# Patient Record
Sex: MALE | Race: WHITE | NOT HISPANIC OR LATINO | Employment: OTHER | URBAN - METROPOLITAN AREA
[De-identification: names, ages, dates, MRNs, and addresses within clinical notes are randomized per-mention and may not be internally consistent; named-entity substitution may affect disease eponyms.]

---

## 2021-09-18 ENCOUNTER — HOSPITAL ENCOUNTER (EMERGENCY)
Facility: HOSPITAL | Age: 71
Discharge: HOME | End: 2021-09-18
Attending: EMERGENCY MEDICINE
Payer: MEDICARE

## 2021-09-18 VITALS
SYSTOLIC BLOOD PRESSURE: 149 MMHG | BODY MASS INDEX: 26.68 KG/M2 | OXYGEN SATURATION: 99 % | DIASTOLIC BLOOD PRESSURE: 68 MMHG | RESPIRATION RATE: 13 BRPM | WEIGHT: 170 LBS | TEMPERATURE: 96 F | HEART RATE: 71 BPM | HEIGHT: 67 IN

## 2021-09-18 DIAGNOSIS — S09.90XA CLOSED HEAD INJURY, INITIAL ENCOUNTER: ICD-10-CM

## 2021-09-18 DIAGNOSIS — S01.512A LACERATION OF ORAL CAVITY, INITIAL ENCOUNTER: Primary | ICD-10-CM

## 2021-09-18 PROCEDURE — 99284 EMERGENCY DEPT VISIT MOD MDM: CPT | Mod: 25

## 2021-09-18 PROCEDURE — 90715 TDAP VACCINE 7 YRS/> IM: CPT | Performed by: EMERGENCY MEDICINE

## 2021-09-18 PROCEDURE — 90471 IMMUNIZATION ADMIN: CPT | Performed by: EMERGENCY MEDICINE

## 2021-09-18 PROCEDURE — 25000003 PHARM REV CODE 250: Performed by: EMERGENCY MEDICINE

## 2021-09-18 PROCEDURE — 12052 INTMD RPR FACE/MM 2.6-5.0 CM: CPT

## 2021-09-18 PROCEDURE — 12013 RPR F/E/E/N/L/M 2.6-5.0 CM: CPT

## 2021-09-18 PROCEDURE — 63600175 PHARM REV CODE 636 W HCPCS: Performed by: EMERGENCY MEDICINE

## 2021-09-18 RX ORDER — GLYBURIDE 2.5 MG/1
2.5 TABLET ORAL 2 TIMES DAILY
COMMUNITY
Start: 2021-06-19 | End: 2024-02-29 | Stop reason: DRUGHIGH

## 2021-09-18 RX ORDER — CLOPIDOGREL BISULFATE 75 MG/1
75 TABLET ORAL DAILY
COMMUNITY
Start: 2021-06-19

## 2021-09-18 RX ORDER — LIDOCAINE HYDROCHLORIDE AND EPINEPHRINE 5; 5 MG/ML; UG/ML
1 INJECTION, SOLUTION INFILTRATION; PERINEURAL ONCE
Status: DISCONTINUED | OUTPATIENT
Start: 2021-09-18 | End: 2021-09-18

## 2021-09-18 RX ORDER — METOPROLOL TARTRATE 100 MG/1
50 TABLET ORAL 2 TIMES DAILY
COMMUNITY
Start: 2021-06-19

## 2021-09-18 RX ORDER — LIDOCAINE HYDROCHLORIDE AND EPINEPHRINE 10; 10 MG/ML; UG/ML
1 INJECTION, SOLUTION INFILTRATION; PERINEURAL ONCE
Status: COMPLETED | OUTPATIENT
Start: 2021-09-18 | End: 2021-09-18

## 2021-09-18 RX ORDER — METFORMIN HYDROCHLORIDE 1000 MG/1
1000 TABLET ORAL EVERY MORNING
COMMUNITY
Start: 2021-06-19

## 2021-09-18 RX ORDER — ATORVASTATIN CALCIUM 40 MG/1
40 TABLET, FILM COATED ORAL DAILY
COMMUNITY
Start: 2021-06-19

## 2021-09-18 RX ORDER — RAMIPRIL 2.5 MG/1
2.5 CAPSULE ORAL NIGHTLY
COMMUNITY
Start: 2021-06-19

## 2021-09-18 RX ADMIN — CLOSTRIDIUM TETANI TOXOID ANTIGEN (FORMALDEHYDE INACTIVATED), CORYNEBACTERIUM DIPHTHERIAE TOXOID ANTIGEN (FORMALDEHYDE INACTIVATED), BORDETELLA PERTUSSIS TOXOID ANTIGEN (GLUTARALDEHYDE INACTIVATED), BORDETELLA PERTUSSIS FILAMENTOUS HEMAGGLUTININ ANTIGEN (FORMALDEHYDE INACTIVATED), BORDETELLA PERTUSSIS PERTACTIN ANTIGEN, AND BORDETELLA PERTUSSIS FIMBRIAE 2/3 ANTIGEN 0.5 ML: 5; 2; 2.5; 5; 3; 5 INJECTION, SUSPENSION INTRAMUSCULAR at 02:09

## 2021-09-18 RX ADMIN — LIDOCAINE HYDROCHLORIDE,EPINEPHRINE BITARTRATE 1 ML: 10; .01 INJECTION, SOLUTION INFILTRATION; PERINEURAL at 03:09

## 2024-02-29 ENCOUNTER — HOSPITAL ENCOUNTER (INPATIENT)
Facility: HOSPITAL | Age: 74
LOS: 1 days | Discharge: HOME | DRG: 446 | End: 2024-03-01
Attending: EMERGENCY MEDICINE | Admitting: INTERNAL MEDICINE
Payer: MEDICARE

## 2024-02-29 DIAGNOSIS — K81.9 CHOLECYSTITIS: Primary | ICD-10-CM

## 2024-02-29 DIAGNOSIS — E11.9 TYPE 2 DIABETES MELLITUS WITHOUT COMPLICATION, WITHOUT LONG-TERM CURRENT USE OF INSULIN: ICD-10-CM

## 2024-02-29 DIAGNOSIS — I10 PRIMARY HYPERTENSION: ICD-10-CM

## 2024-02-29 DIAGNOSIS — K80.00 ACUTE CHOLECYSTITIS DUE TO BILIARY CALCULUS: ICD-10-CM

## 2024-02-29 DIAGNOSIS — R10.13 EPIGASTRIC PAIN: ICD-10-CM

## 2024-02-29 DIAGNOSIS — R10.10 UPPER ABDOMINAL PAIN: ICD-10-CM

## 2024-02-29 PROBLEM — I25.10 CORONARY ARTERY DISEASE WITHOUT ANGINA PECTORIS: Status: ACTIVE | Noted: 2024-02-29

## 2024-02-29 LAB
ALBUMIN SERPL BCP-MCNC: 3.6 G/DL (ref 3.5–5.2)
ALP SERPL-CCNC: 84 U/L (ref 55–135)
ALT SERPL W/O P-5'-P-CCNC: 13 U/L (ref 10–44)
ANION GAP SERPL CALC-SCNC: 15 MMOL/L (ref 8–16)
AST SERPL-CCNC: 20 U/L (ref 10–40)
BACTERIA #/AREA URNS HPF: ABNORMAL /HPF
BASOPHILS # BLD AUTO: 0.1 K/UL (ref 0–0.2)
BASOPHILS NFR BLD: 0.5 % (ref 0–1.9)
BILIRUB SERPL-MCNC: 0.8 MG/DL (ref 0.1–1)
BILIRUB UR QL STRIP: NEGATIVE
BUN SERPL-MCNC: 14 MG/DL (ref 8–23)
CALCIUM SERPL-MCNC: 10 MG/DL (ref 8.7–10.5)
CHLORIDE SERPL-SCNC: 105 MMOL/L (ref 95–110)
CLARITY UR: CLEAR
CO2 SERPL-SCNC: 19 MMOL/L (ref 23–29)
COLOR UR: YELLOW
CREAT SERPL-MCNC: 1.2 MG/DL (ref 0.5–1.4)
DIFFERENTIAL METHOD BLD: ABNORMAL
EOSINOPHIL # BLD AUTO: 0.4 K/UL (ref 0–0.5)
EOSINOPHIL NFR BLD: 1.8 % (ref 0–8)
ERYTHROCYTE [DISTWIDTH] IN BLOOD BY AUTOMATED COUNT: 12.3 % (ref 11.5–14.5)
EST. GFR  (NO RACE VARIABLE): >60 ML/MIN/1.73 M^2
ESTIMATED AVG GLUCOSE: 160 MG/DL (ref 68–131)
GLUCOSE SERPL-MCNC: 230 MG/DL (ref 70–110)
GLUCOSE UR QL STRIP: ABNORMAL
HBA1C MFR BLD: 7.2 % (ref 4–5.6)
HCT VFR BLD AUTO: 42.4 % (ref 40–54)
HGB BLD-MCNC: 14.8 G/DL (ref 14–18)
HGB UR QL STRIP: NEGATIVE
HYALINE CASTS #/AREA URNS LPF: 12 /LPF
IMM GRANULOCYTES # BLD AUTO: 0.12 K/UL (ref 0–0.04)
IMM GRANULOCYTES NFR BLD AUTO: 0.6 % (ref 0–0.5)
INR PPP: 1.1 (ref 0.8–1.2)
KETONES UR QL STRIP: ABNORMAL
LEUKOCYTE ESTERASE UR QL STRIP: NEGATIVE
LIPASE SERPL-CCNC: 38 U/L (ref 4–60)
LYMPHOCYTES # BLD AUTO: 3.2 K/UL (ref 1–4.8)
LYMPHOCYTES NFR BLD: 16.5 % (ref 18–48)
MCH RBC QN AUTO: 30.5 PG (ref 27–31)
MCHC RBC AUTO-ENTMCNC: 34.9 G/DL (ref 32–36)
MCV RBC AUTO: 87 FL (ref 82–98)
MICROSCOPIC COMMENT: ABNORMAL
MONOCYTES # BLD AUTO: 1.4 K/UL (ref 0.3–1)
MONOCYTES NFR BLD: 7.1 % (ref 4–15)
NEUTROPHILS # BLD AUTO: 14.3 K/UL (ref 1.8–7.7)
NEUTROPHILS NFR BLD: 73.5 % (ref 38–73)
NITRITE UR QL STRIP: NEGATIVE
NRBC BLD-RTO: 0 /100 WBC
OHS QRS DURATION: 82 MS
OHS QTC CALCULATION: 410 MS
PH UR STRIP: 6 [PH] (ref 5–8)
PLATELET # BLD AUTO: 425 K/UL (ref 150–450)
PMV BLD AUTO: 8.8 FL (ref 9.2–12.9)
POCT GLUCOSE: 167 MG/DL (ref 70–110)
POCT GLUCOSE: 173 MG/DL (ref 70–110)
POTASSIUM SERPL-SCNC: 4.1 MMOL/L (ref 3.5–5.1)
PROT SERPL-MCNC: 7.3 G/DL (ref 6–8.4)
PROT UR QL STRIP: ABNORMAL
PROTHROMBIN TIME: 11.6 SEC (ref 9–12.5)
RBC # BLD AUTO: 4.86 M/UL (ref 4.6–6.2)
RBC #/AREA URNS HPF: 0 /HPF (ref 0–4)
SODIUM SERPL-SCNC: 139 MMOL/L (ref 136–145)
SP GR UR STRIP: 1.02 (ref 1–1.03)
SQUAMOUS #/AREA URNS HPF: 0 /HPF
TROPONIN I SERPL DL<=0.01 NG/ML-MCNC: <0.006 NG/ML (ref 0–0.03)
URN SPEC COLLECT METH UR: ABNORMAL
UROBILINOGEN UR STRIP-ACNC: NEGATIVE EU/DL
WBC # BLD AUTO: 19.5 K/UL (ref 3.9–12.7)
WBC #/AREA URNS HPF: 0 /HPF (ref 0–5)
YEAST URNS QL MICRO: ABNORMAL

## 2024-02-29 PROCEDURE — 63600175 PHARM REV CODE 636 W HCPCS: Performed by: NURSE PRACTITIONER

## 2024-02-29 PROCEDURE — 25000003 PHARM REV CODE 250: Performed by: EMERGENCY MEDICINE

## 2024-02-29 PROCEDURE — 96376 TX/PRO/DX INJ SAME DRUG ADON: CPT

## 2024-02-29 PROCEDURE — 99285 EMERGENCY DEPT VISIT HI MDM: CPT | Mod: 25

## 2024-02-29 PROCEDURE — G0378 HOSPITAL OBSERVATION PER HR: HCPCS

## 2024-02-29 PROCEDURE — 81000 URINALYSIS NONAUTO W/SCOPE: CPT | Performed by: NURSE PRACTITIONER

## 2024-02-29 PROCEDURE — 83036 HEMOGLOBIN GLYCOSYLATED A1C: CPT | Performed by: INTERNAL MEDICINE

## 2024-02-29 PROCEDURE — 99222 1ST HOSP IP/OBS MODERATE 55: CPT | Mod: ,,, | Performed by: STUDENT IN AN ORGANIZED HEALTH CARE EDUCATION/TRAINING PROGRAM

## 2024-02-29 PROCEDURE — 25500020 PHARM REV CODE 255: Performed by: EMERGENCY MEDICINE

## 2024-02-29 PROCEDURE — 25000003 PHARM REV CODE 250

## 2024-02-29 PROCEDURE — 63600175 PHARM REV CODE 636 W HCPCS: Performed by: INTERNAL MEDICINE

## 2024-02-29 PROCEDURE — 87040 BLOOD CULTURE FOR BACTERIA: CPT | Performed by: INTERNAL MEDICINE

## 2024-02-29 PROCEDURE — 96375 TX/PRO/DX INJ NEW DRUG ADDON: CPT

## 2024-02-29 PROCEDURE — 80053 COMPREHEN METABOLIC PANEL: CPT | Performed by: NURSE PRACTITIONER

## 2024-02-29 PROCEDURE — 25000003 PHARM REV CODE 250: Performed by: INTERNAL MEDICINE

## 2024-02-29 PROCEDURE — 83690 ASSAY OF LIPASE: CPT | Performed by: NURSE PRACTITIONER

## 2024-02-29 PROCEDURE — 93005 ELECTROCARDIOGRAM TRACING: CPT

## 2024-02-29 PROCEDURE — 25000003 PHARM REV CODE 250: Performed by: STUDENT IN AN ORGANIZED HEALTH CARE EDUCATION/TRAINING PROGRAM

## 2024-02-29 PROCEDURE — 63600175 PHARM REV CODE 636 W HCPCS: Performed by: STUDENT IN AN ORGANIZED HEALTH CARE EDUCATION/TRAINING PROGRAM

## 2024-02-29 PROCEDURE — 0F9430Z DRAINAGE OF GALLBLADDER WITH DRAINAGE DEVICE, PERCUTANEOUS APPROACH: ICD-10-PCS | Performed by: STUDENT IN AN ORGANIZED HEALTH CARE EDUCATION/TRAINING PROGRAM

## 2024-02-29 PROCEDURE — 93010 ELECTROCARDIOGRAM REPORT: CPT | Mod: ,,, | Performed by: INTERNAL MEDICINE

## 2024-02-29 PROCEDURE — 96365 THER/PROPH/DIAG IV INF INIT: CPT

## 2024-02-29 PROCEDURE — 85610 PROTHROMBIN TIME: CPT | Performed by: EMERGENCY MEDICINE

## 2024-02-29 PROCEDURE — 85025 COMPLETE CBC W/AUTO DIFF WBC: CPT | Performed by: NURSE PRACTITIONER

## 2024-02-29 PROCEDURE — 96361 HYDRATE IV INFUSION ADD-ON: CPT

## 2024-02-29 PROCEDURE — 82962 GLUCOSE BLOOD TEST: CPT

## 2024-02-29 PROCEDURE — 63600175 PHARM REV CODE 636 W HCPCS: Performed by: EMERGENCY MEDICINE

## 2024-02-29 PROCEDURE — 84484 ASSAY OF TROPONIN QUANT: CPT | Performed by: NURSE PRACTITIONER

## 2024-02-29 RX ORDER — HYDRALAZINE HYDROCHLORIDE 20 MG/ML
10 INJECTION INTRAMUSCULAR; INTRAVENOUS EVERY 6 HOURS PRN
Status: DISCONTINUED | OUTPATIENT
Start: 2024-02-29 | End: 2024-03-01 | Stop reason: HOSPADM

## 2024-02-29 RX ORDER — SODIUM CHLORIDE 9 MG/ML
1000 INJECTION, SOLUTION INTRAVENOUS
Status: COMPLETED | OUTPATIENT
Start: 2024-02-29 | End: 2024-02-29

## 2024-02-29 RX ORDER — ASPIRIN 81 MG/1
81 TABLET ORAL DAILY
COMMUNITY

## 2024-02-29 RX ORDER — MORPHINE SULFATE 2 MG/ML
2 INJECTION, SOLUTION INTRAMUSCULAR; INTRAVENOUS ONCE
Status: COMPLETED | OUTPATIENT
Start: 2024-02-29 | End: 2024-02-29

## 2024-02-29 RX ORDER — IBUPROFEN 200 MG
200 TABLET ORAL EVERY 6 HOURS PRN
COMMUNITY

## 2024-02-29 RX ORDER — ONDANSETRON HYDROCHLORIDE 2 MG/ML
4 INJECTION, SOLUTION INTRAVENOUS
Status: COMPLETED | OUTPATIENT
Start: 2024-02-29 | End: 2024-02-29

## 2024-02-29 RX ORDER — ONDANSETRON HYDROCHLORIDE 2 MG/ML
4 INJECTION, SOLUTION INTRAVENOUS EVERY 4 HOURS PRN
Status: DISCONTINUED | OUTPATIENT
Start: 2024-02-29 | End: 2024-03-01 | Stop reason: HOSPADM

## 2024-02-29 RX ORDER — LIDOCAINE HYDROCHLORIDE 20 MG/ML
15 SOLUTION OROPHARYNGEAL ONCE
Status: DISCONTINUED | OUTPATIENT
Start: 2024-02-29 | End: 2024-02-29

## 2024-02-29 RX ORDER — MORPHINE SULFATE 4 MG/ML
4 INJECTION, SOLUTION INTRAMUSCULAR; INTRAVENOUS
Status: COMPLETED | OUTPATIENT
Start: 2024-02-29 | End: 2024-02-29

## 2024-02-29 RX ORDER — SODIUM CHLORIDE 9 MG/ML
INJECTION, SOLUTION INTRAVENOUS CONTINUOUS
Status: DISCONTINUED | OUTPATIENT
Start: 2024-02-29 | End: 2024-03-01

## 2024-02-29 RX ORDER — LIDOCAINE HYDROCHLORIDE 10 MG/ML
INJECTION INFILTRATION; PERINEURAL
Status: COMPLETED | OUTPATIENT
Start: 2024-02-29 | End: 2024-02-29

## 2024-02-29 RX ORDER — FENTANYL CITRATE 50 UG/ML
INJECTION, SOLUTION INTRAMUSCULAR; INTRAVENOUS
Status: COMPLETED | OUTPATIENT
Start: 2024-02-29 | End: 2024-02-29

## 2024-02-29 RX ORDER — GLYBURIDE 5 MG/1
5 TABLET ORAL 2 TIMES DAILY
COMMUNITY
Start: 2024-01-22

## 2024-02-29 RX ORDER — GLUCAGON 1 MG
1 KIT INJECTION
Status: DISCONTINUED | OUTPATIENT
Start: 2024-02-29 | End: 2024-03-01 | Stop reason: HOSPADM

## 2024-02-29 RX ORDER — ALUMINUM HYDROXIDE, MAGNESIUM HYDROXIDE, AND SIMETHICONE 1200; 120; 1200 MG/30ML; MG/30ML; MG/30ML
30 SUSPENSION ORAL ONCE
Status: DISCONTINUED | OUTPATIENT
Start: 2024-02-29 | End: 2024-02-29

## 2024-02-29 RX ORDER — METFORMIN HYDROCHLORIDE 500 MG/1
250 TABLET ORAL 2 TIMES DAILY
COMMUNITY
Start: 2024-01-22

## 2024-02-29 RX ORDER — MIDAZOLAM HYDROCHLORIDE 1 MG/ML
INJECTION INTRAMUSCULAR; INTRAVENOUS
Status: COMPLETED | OUTPATIENT
Start: 2024-02-29 | End: 2024-02-29

## 2024-02-29 RX ADMIN — LIDOCAINE HYDROCHLORIDE 5 ML: 10 INJECTION, SOLUTION INFILTRATION; PERINEURAL at 04:02

## 2024-02-29 RX ADMIN — ONDANSETRON 4 MG: 2 INJECTION INTRAMUSCULAR; INTRAVENOUS at 09:02

## 2024-02-29 RX ADMIN — MORPHINE SULFATE 2 MG: 2 INJECTION, SOLUTION INTRAMUSCULAR; INTRAVENOUS at 07:02

## 2024-02-29 RX ADMIN — MIDAZOLAM HYDROCHLORIDE 1 MG: 1 INJECTION INTRAMUSCULAR; INTRAVENOUS at 04:02

## 2024-02-29 RX ADMIN — FENTANYL CITRATE 50 MCG: 50 INJECTION INTRAMUSCULAR; INTRAVENOUS at 04:02

## 2024-02-29 RX ADMIN — SODIUM CHLORIDE 1000 ML: 9 INJECTION, SOLUTION INTRAVENOUS at 12:02

## 2024-02-29 RX ADMIN — FENTANYL CITRATE 25 MCG: 50 INJECTION INTRAMUSCULAR; INTRAVENOUS at 04:02

## 2024-02-29 RX ADMIN — PIPERACILLIN AND TAZOBACTAM 4.5 G: 4; .5 INJECTION, POWDER, LYOPHILIZED, FOR SOLUTION INTRAVENOUS; PARENTERAL at 12:02

## 2024-02-29 RX ADMIN — SODIUM CHLORIDE 500 ML: 9 INJECTION, SOLUTION INTRAVENOUS at 10:02

## 2024-02-29 RX ADMIN — SODIUM CHLORIDE: 9 INJECTION, SOLUTION INTRAVENOUS at 08:02

## 2024-02-29 RX ADMIN — PIPERACILLIN AND TAZOBACTAM 4.5 G: 4; .5 INJECTION, POWDER, LYOPHILIZED, FOR SOLUTION INTRAVENOUS; PARENTERAL at 10:02

## 2024-02-29 RX ADMIN — MORPHINE SULFATE 4 MG: 4 INJECTION INTRAVENOUS at 09:02

## 2024-02-29 RX ADMIN — IOHEXOL 75 ML: 350 INJECTION, SOLUTION INTRAVENOUS at 10:02

## 2024-02-29 NOTE — ASSESSMENT & PLAN NOTE
-patient has a history of CABG   -stable.  Patient denies chest pain or shortness for breath   -EKG=Normal sinus rhythm. Possible Lateral infarct ,age undetermined   -hold aspirin and Plavix until cleared by surgery  -continue statin and beta-blocker when tolerating oral diet

## 2024-02-29 NOTE — ASSESSMENT & PLAN NOTE
-monitor blood pressure   -covered with hydralazine 10 mg IV p.r.n.   -adjust BP medications needed when tolerating oral diet

## 2024-02-29 NOTE — PROCEDURES
Interventional Radiology Post-Procedure Note    Pre Op Diagnosis: acute cholecystitis  Post Op Diagnosis: Same    Procedure: percutaneous cholecystostomy tube placement    Procedure performed by: Patti Soni MD    Written Informed Consent Obtained: Yes  Specimen Removed: NO  Estimated Blood Loss: Minimal    Findings:   US and fluoro-guided 8F percutaneous cholecystostomy drain placement via transhepatic approach. The cystic duct is patent, but there is a stone lodged within the gallbladder neck. Reflux of contrast into intrahepatic biliary ducts with passage of contrast across the CBD and into the bowel.     Patient tolerated procedure well.    Leave drain to gravity. Flush BID with 10cc NS. Routine exchange in 10-12 weeks unless interval cholecystectomy performed.       Patti Soni MD  Interventional Radiology

## 2024-02-29 NOTE — PROCEDURES
Consult Note  Interventional Radiology    Reason for Consult: percutaneous cholecystostomy tube placement    SUBJECTIVE:     Chief Complaint: abdominal pain    History of Present Illness: 73M PMHx T2DM, HTN, CAD (PCI 2011, on Plavix) who presented with 1 week of upper abdominal pain and N/V. Patient states the pain started approximately 1 week ago and has been waxing and waning. Endorses severe pain this morning as well as fever and N/V.     Past Medical History:   Diagnosis Date    Coronary artery disease     Diabetes mellitus     Hypertension      No past surgical history on file.  No family history on file.       Review of Systems:  Constitutional/General:Positive for fever.  Hematological/Immuno: no known coagulopathies  Respiratory: no shortness of breath  Cardiovascular: no chest pain  Gastrointestinal: positive for - abdominal pain  Genito-Urinary: no dysuria  Musculoskeletal: negative  Skin: Negative for rash, itching, pigmentation changes, nail or hair changes.  Neurological: no TIA or stroke symptoms  Psychiatric: normal mood/affect, good insight/judgement      OBJECTIVE:     Vital Signs Range (Last 24H):  Temp:  [97.5 °F (36.4 °C)-97.7 °F (36.5 °C)]   Pulse:  [61-73]   Resp:  [18-20]   BP: (180-193)/(84-86)   SpO2:  [99 %-100 %]     Physical Exam:  General- Patient AAO x3 in NAD  ENT- EOMI  Neck- No masses  CV- Normal rate  Resp-  No increased WOB  GI- non-distended, mild TTP RUQ  Extrem- No cyanosis, clubbing  Derm- No rashes, masses, or lesions noted  Neuro-  No focal deficits noted    Physical Exam  Body mass index is 26.31 kg/m².    Scheduled Meds:   Continuous Infusions:   PRN Meds:    Allergies: Review of patient's allergies indicates:  No Known Allergies    Labs:  Recent Labs   Lab 02/29/24  1420   INR 1.1       Recent Labs   Lab 02/29/24  0901   WBC 19.50*   HGB 14.8   HCT 42.4   MCV 87         Recent Labs   Lab 02/29/24  0901   *      K 4.1      CO2 19*   BUN 14    CREATININE 1.2   CALCIUM 10.0   ALT 13   AST 20   ALBUMIN 3.6   BILITOT 0.8       Vitals (Most Recent):  Temp: 97.7 °F (36.5 °C) (02/29/24 1355)  Pulse: 73 (02/29/24 1200)  Resp: 20 (02/29/24 0902)  BP: (!) 180/86 (02/29/24 1200)  SpO2: 99 % (02/29/24 1200)    ASA: 3  Mallampati: 3    Consent obtained.     RUQ US and CT A/P 2/29/2024 personally reviewed and demonstrates cholelithiasis, pericholecystic fluid, gallbladder distension, and incidental note of Phrygian cap.     ASSESSMENT/PLAN:     73M PMHx T2DM, HTN, CAD (PCI 2011, on Plavix) who presented with 1 week of upper abdominal pain and N/V. Clinical and imaging findings of acute calculous cholecystitis with leukocytosis. Patient on Plavix. Discussed increased risk of bleeding, patient and wife endorse understanding.   - Percutaneous cholecystostomy tube placement with moderate sedation    Active Hospital Problems    Diagnosis  POA    Cholelithiasis with acute cholecystitis [K80.00]  Yes      Resolved Hospital Problems   No resolved problems to display.           Patti Soni MD

## 2024-02-29 NOTE — ED NOTES
Comfort measures initiated. Care plan discussed with pt. Call light with reach of pt. Will continue to monitor.

## 2024-02-29 NOTE — ASSESSMENT & PLAN NOTE
73M with a history of CAD (s/p stenting in 2011, on plavix), DM2, and HTN who presents with ~1 week of abdominal pain. Lab work and imaging suggestive of acute cholelithiasis. Given that the patient is taking plavix, would recommend IR consultation for cholecystostomy tube placement in the acute setting. Can see the patient in the outpatient setting and plan for elective cholecystectomy at a later date, when his plavix can be held.     -- recommend admission to hospital medicine   -- recommend IR consultation for consideration of cholecystostomy tube placement   -- NPO, mIVF  -- agree with abx   -- can see Mr. Coyne as an outpatient and schedule him for elective cholecystectomy

## 2024-02-29 NOTE — HPI
This is a 73-year-old  male who comes in with abdominal pain that started about a week ago initially.  It became better and patient was doing fine until this morning when he woke up and had sudden onset abdominal pain with some nausea vomiting.  Therefore we decided to come to the ER for further evaluation and treatment.  CT imaging suspicious for calculus cholecystitis.  General surgery was consulted.  General surgery recommended IR consult for percutaneous intervention and no surgical intervention.  IR consulted and patient is now status post right upper quadrant cholecystostomy tube placement .  Patient's wife by bedside also helps provide history.  Patient is awake alert and oriented x3 and in no acute distress.  Denies chest pain shortness on breath fever chills, dysuria hematuria, blood in stools black stools, loss of consciousness or seizures.  Patient admitted to hospitalist services

## 2024-02-29 NOTE — SUBJECTIVE & OBJECTIVE
Past Medical History:   Diagnosis Date    Coronary artery disease     Diabetes mellitus     Hypertension        No past surgical history on file.    Review of patient's allergies indicates:  No Known Allergies    No current facility-administered medications on file prior to encounter.     Current Outpatient Medications on File Prior to Encounter   Medication Sig    aspirin (ECOTRIN) 81 MG EC tablet Take 81 mg by mouth once daily.    atorvastatin (LIPITOR) 40 MG tablet Take 40 mg by mouth once daily.    clopidogreL (PLAVIX) 75 mg tablet Take 75 mg by mouth once daily.    glyBURIDE (DIABETA) 5 MG tablet Take 5 mg by mouth 2 (two) times daily.    ibuprofen (ADVIL,MOTRIN) 200 MG tablet Take 200 mg by mouth every 6 (six) hours as needed for Pain.    metFORMIN (GLUCOPHAGE) 1000 MG tablet Take 1,000 mg by mouth every morning. Take with 1/2 tablet of 500 mg(1250 mg)    metFORMIN (GLUCOPHAGE) 500 MG tablet Take 250 mg by mouth 2 (two) times a day.    metoprolol tartrate (LOPRESSOR) 100 MG tablet Take 50 mg by mouth 2 (two) times daily.    ramipriL (ALTACE) 2.5 MG capsule Take 2.5 mg by mouth every evening.    [DISCONTINUED] glyBURIDE (DIABETA) 2.5 MG tablet Take 2.5 mg by mouth 2 (two) times daily.     Family History    None       Tobacco Use    Smoking status: Not on file    Smokeless tobacco: Not on file   Substance and Sexual Activity    Alcohol use: Not on file    Drug use: Not on file    Sexual activity: Not on file     Review of Systems   Constitutional: Negative.    HENT: Negative.     Eyes: Negative.    Respiratory: Negative.     Cardiovascular: Negative.    Gastrointestinal:  Positive for abdominal pain, nausea and vomiting.   Endocrine: Negative.    Genitourinary: Negative.    Musculoskeletal: Negative.    Skin: Negative.    Allergic/Immunologic: Negative.    Neurological: Negative.    Hematological: Negative.    Psychiatric/Behavioral: Negative.     All other systems reviewed and are negative.    Objective:      Vital Signs (Most Recent):  Temp: 98.2 °F (36.8 °C) (02/29/24 1706)  Pulse: 81 (02/29/24 1706)  Resp: 20 (02/29/24 1706)  BP: (!) 161/72 (02/29/24 1706)  SpO2: 100 % (02/29/24 1706) Vital Signs (24h Range):  Temp:  [97.5 °F (36.4 °C)-98.2 °F (36.8 °C)] 98.2 °F (36.8 °C)  Pulse:  [61-81] 81  Resp:  [17-20] 20  SpO2:  [99 %-100 %] 100 %  BP: (156-193)/(72-86) 161/72     Weight: 76.2 kg (168 lb)  Body mass index is 26.31 kg/m².     Physical Exam  Vitals and nursing note reviewed.   Constitutional:       General: He is not in acute distress.     Appearance: Normal appearance.   HENT:      Head: Normocephalic and atraumatic.      Nose: Nose normal.      Mouth/Throat:      Mouth: Mucous membranes are moist.   Eyes:      Extraocular Movements: Extraocular movements intact.      Pupils: Pupils are equal, round, and reactive to light.   Cardiovascular:      Rate and Rhythm: Normal rate and regular rhythm.      Pulses: Normal pulses.      Heart sounds: Normal heart sounds.   Pulmonary:      Effort: Pulmonary effort is normal. No respiratory distress.      Breath sounds: Normal breath sounds.   Abdominal:      General: Bowel sounds are normal.      Palpations: Abdomen is soft.      Comments: Right upper quadrant drain in place.  No bleeding discharge or erythema seen around surgical site.  Tenderness to right upper quadrant   Musculoskeletal:         General: No deformity. Normal range of motion.      Cervical back: Normal range of motion and neck supple.      Right lower leg: No edema.      Left lower leg: No edema.   Skin:     General: Skin is warm and dry.   Neurological:      General: No focal deficit present.      Mental Status: He is alert and oriented to person, place, and time. Mental status is at baseline.      Cranial Nerves: No cranial nerve deficit.   Psychiatric:         Mood and Affect: Mood normal.              CRANIAL NERVES     CN III, IV, VI   Pupils are equal, round, and reactive to light.        Significant Labs: All pertinent labs within the past 24 hours have been reviewed.    Significant Imaging: I have reviewed all pertinent imaging results/findings within the past 24 hours.

## 2024-02-29 NOTE — CONSULTS
"Please see concomitantly written note, erroneously filed under "Procedure" heading for full IR consult note.   "

## 2024-02-29 NOTE — CONSULTS
[facility] - Emergency Dept  General Surgery  Consult Note    Patient Name: Yoan Coyne  MRN: [id number]  Code Status: No Order  Admission Date: 2/29/2024  Hospital Length of Stay: 0 days  Attending Physician: Young Steve,*  Primary Care Provider: Charleen, Primary Doctor    Patient information was obtained from patient and spouse/SO.     Consults  Subjective:     Principal Problem: acute cholecystitis    History of Present Illness: Yoan Coyne is a 73 y.o. male with a history of CAD (s/p stenting in 2011), DM2, and HTN who presents to the ED with ~ 1 week of abdominal pain associated with N/V. The patient states that about a week ago he had some catfish after which he developed abdominal pain, nausea, and vomiting. This pain was intermittent over the 3 days following this. It eventually resolved until last night when he began to experience sever abdominal pain, nausea, and vomiting. States that he woke this morning with a fever and decided to present to the ED. In the ED, the patient is afebrile and hemodynamically stable. His lab work is remarkable for a leukocytosis of 19.5 and hyperglycemia (in the setting of known DM2). RUQ US showing cholelithiasis, gallbladder wall thickening, and pericholecystic fluid. CT A/P with similar findings including a stone in the neck of the gallbladder. No intra or extrahepatic biliary ductal dilation. Of note, the patient is currently taking plavix, last dose today.       No current facility-administered medications on file prior to encounter.     Current Outpatient Medications on File Prior to Encounter   Medication Sig    aspirin (ECOTRIN) 81 MG EC tablet Take 81 mg by mouth once daily.    atorvastatin (LIPITOR) 40 MG tablet Take 40 mg by mouth once daily.    clopidogreL (PLAVIX) 75 mg tablet Take 75 mg by mouth once daily.    glyBURIDE (DIABETA) 5 MG tablet Take 5 mg by mouth 2 (two) times daily.    ibuprofen (ADVIL,MOTRIN) 200 MG tablet Take 200 mg by mouth every 6 (six)  hours as needed for Pain.    metFORMIN (GLUCOPHAGE) 1000 MG tablet Take 1,000 mg by mouth every morning. Take with 1/2 tablet of 500 mg(1250 mg)    metFORMIN (GLUCOPHAGE) 500 MG tablet Take 250 mg by mouth 2 (two) times a day.    metoprolol tartrate (LOPRESSOR) 100 MG tablet Take 50 mg by mouth 2 (two) times daily.    ramipriL (ALTACE) 2.5 MG capsule Take 2.5 mg by mouth every evening.    [DISCONTINUED] glyBURIDE (DIABETA) 2.5 MG tablet Take 2.5 mg by mouth 2 (two) times daily.       Review of patient's allergies indicates:  No Known Allergies    Past Medical History:   Diagnosis Date    Coronary artery disease     Diabetes mellitus     Hypertension      No past surgical history on file.  Family History    None       Tobacco Use    Smoking status: Not on file    Smokeless tobacco: Not on file   Substance and Sexual Activity    Alcohol use: Not on file    Drug use: Not on file    Sexual activity: Not on file     Review of Systems   Constitutional:  Positive for fever (subjective). Negative for chills.   HENT:  Negative for trouble swallowing and voice change.    Eyes: Negative.    Respiratory:  Negative for chest tightness and shortness of breath.    Cardiovascular:  Negative for chest pain and palpitations.   Gastrointestinal:  Positive for abdominal pain, nausea and vomiting. Negative for abdominal distention.   Endocrine: Negative.    Genitourinary:  Negative for difficulty urinating and flank pain.   Musculoskeletal: Negative.    Skin: Negative.    Neurological: Negative.    Hematological: Negative.    Psychiatric/Behavioral: Negative.       Objective:     Vital Signs (Most Recent):  Temp: 97.5 °F (36.4 °C) (02/29/24 0828)  Pulse: 73 (02/29/24 1200)  Resp: 20 (02/29/24 0902)  BP: (!) 180/86 (02/29/24 1200)  SpO2: 99 % (02/29/24 1200) Vital Signs (24h Range):  Temp:  [97.5 °F (36.4 °C)] 97.5 °F (36.4 °C)  Pulse:  [61-73] 73  Resp:  [18-20] 20  SpO2:  [99 %-100 %] 99 %  BP: (180-193)/(84-86) 180/86     Weight: 76.2  kg (168 lb)  Body mass index is 26.31 kg/m².     Physical Exam  Vitals reviewed.   Constitutional:       General: He is not in acute distress.     Appearance: Normal appearance. He is not toxic-appearing.   HENT:      Head: Normocephalic and atraumatic.      Nose: Nose normal.      Mouth/Throat:      Mouth: Mucous membranes are moist.   Cardiovascular:      Rate and Rhythm: Normal rate.      Pulses: Normal pulses.   Pulmonary:      Effort: Pulmonary effort is normal. No respiratory distress.   Abdominal:      General: Abdomen is flat. There is no distension.      Palpations: Abdomen is soft.      Tenderness: There is abdominal tenderness. There is no guarding or rebound.      Comments: Former skin graft donor site to abdomen, well healed   Mild tenderness to palpation, localizing to the RUQ; no guarding, no rebound, non peritonitic   Skin:     General: Skin is warm.   Neurological:      General: No focal deficit present.      Mental Status: He is alert and oriented to person, place, and time.   Psychiatric:         Mood and Affect: Mood normal.         Behavior: Behavior normal.            I have reviewed all pertinent lab results within the past 24 hours.  CBC:   Recent Labs   Lab 02/29/24  0901   WBC 19.50*   RBC 4.86   HGB 14.8   HCT 42.4      MCV 87   MCH 30.5   MCHC 34.9     CMP:   Recent Labs   Lab 02/29/24  0901   *   CALCIUM 10.0   ALBUMIN 3.6   PROT 7.3      K 4.1   CO2 19*      BUN 14   CREATININE 1.2   ALKPHOS 84   ALT 13   AST 20   BILITOT 0.8       Significant Diagnostics:  I have reviewed all pertinent imaging results/findings within the past 24 hours.    US Abdomen Limited (Gallbladder)  2/29/2024    Findings compatible with acute, calculus cholecystitis.     CT Abdomen Pelvis With IV Contrast NO Oral Contrast  2/29/2024    Findings compatible with acute, calculus cholecystitis.       Assessment/Plan:     Cholelithiasis with acute cholecystitis  73M with a history of CAD (s/p  stenting in 2011, on plavix), DM2, and HTN who presents with ~1 week of abdominal pain. Lab work and imaging suggestive of acute cholelithiasis. Given that the patient is taking plavix, would recommend IR consultation for cholecystostomy tube placement in the acute setting. Can see the patient in the outpatient setting and plan for elective cholecystectomy at a later date, when his plavix can be held.     -- recommend admission to hospital medicine   -- recommend IR consultation for consideration of cholecystostomy tube placement   -- NPO, mIVF  -- agree with abx   -- can see Mr. Coyne as an outpatient and schedule him for elective cholecystectomy         Masha Rodriguez MD  General Surgery  [facility] - Emergency Dept

## 2024-02-29 NOTE — ED PROVIDER NOTES
"Encounter Date: 2/29/2024       History     Chief Complaint   Patient presents with    Abdominal Pain     Pt c/o upper mid abdominal pain with vomiting. Last BM yesterday. Pt denies any chest pain. Pt had similar episode last week after eating fried fish. This episode started this morning.      72 y/o M with a PMHx of HTN, DM and CAD presents to the ED with wife at bedside c/o upper abdominal pain that woke the patient up from sleep at 5:30 AM this morning. He describes the pain to be constant and states he had similar pain 1 week ago which lasted for 4 days and then spontaneously resolved. He reports the pain may be associated with him eating fried fish. He drank some soda which helped alleviate the pain to an extent. He reports of associated N and dry heaving. He did report of "yellow" vomitus last week when he had the pain. He denies any active CP or SOB. No other complaints at this time.    The history is provided by the patient and medical records.     Review of patient's allergies indicates:  No Known Allergies  Past Medical History:   Diagnosis Date    Coronary artery disease     Diabetes mellitus     Hypertension      No past surgical history on file.  No family history on file.     Review of Systems   Constitutional:  Negative for fever.   Cardiovascular:  Negative for chest pain.   Gastrointestinal:  Positive for abdominal pain, nausea and vomiting.       Physical Exam     Initial Vitals [02/29/24 0828]   BP Pulse Resp Temp SpO2   (!) 193/84 61 18 97.5 °F (36.4 °C) 100 %      MAP       --         Physical Exam    Nursing note and vitals reviewed.  Constitutional: Vital signs are normal. He appears well-developed and well-nourished. He is not diaphoretic. No distress.   Appears uncomfortable.   HENT:   Head: Normocephalic and atraumatic.   Right Ear: External ear normal.   Left Ear: External ear normal.   Eyes: EOM are normal. Right eye exhibits no discharge. Left eye exhibits no discharge. No scleral " icterus.   Neck: Trachea normal. Neck supple. No thyroid mass present.   Normal range of motion.  Cardiovascular:  Normal rate, regular rhythm, normal heart sounds and intact distal pulses.     Exam reveals no gallop and no friction rub.       No murmur heard.  Pulmonary/Chest: Breath sounds normal. No respiratory distress. He has no wheezes. He has no rhonchi. He has no rales.   Abdominal: Abdomen is soft. Bowel sounds are normal. He exhibits no distension. There is abdominal tenderness (epigastrium and RUQ). There is no rebound and no guarding.   Musculoskeletal:         General: No tenderness or edema. Normal range of motion.      Cervical back: Normal range of motion and neck supple.     Neurological: He is alert and oriented to person, place, and time. He has normal strength. No cranial nerve deficit or sensory deficit. GCS score is 15. GCS eye subscore is 4. GCS verbal subscore is 5. GCS motor subscore is 6.   Skin: Skin is warm and dry. Capillary refill takes less than 2 seconds. No rash noted.   Psychiatric: He has a normal mood and affect.         ED Course   Procedures  Labs Reviewed   COMPREHENSIVE METABOLIC PANEL - Abnormal; Notable for the following components:       Result Value    CO2 19 (*)     Glucose 230 (*)     All other components within normal limits   CBC W/ AUTO DIFFERENTIAL - Abnormal; Notable for the following components:    WBC 19.50 (*)     MPV 8.8 (*)     Immature Granulocytes 0.6 (*)     Gran # (ANC) 14.3 (*)     Immature Grans (Abs) 0.12 (*)     Mono # 1.4 (*)     Gran % 73.5 (*)     Lymph % 16.5 (*)     All other components within normal limits   URINALYSIS, REFLEX TO URINE CULTURE - Abnormal; Notable for the following components:    Protein, UA 1+ (*)     Glucose, UA 3+ (*)     Ketones, UA Trace (*)     All other components within normal limits    Narrative:     Specimen Source->Urine   URINALYSIS MICROSCOPIC - Abnormal; Notable for the following components:    Hyaline Casts, UA 12  (*)     All other components within normal limits    Narrative:     Specimen Source->Urine   POCT GLUCOSE - Abnormal; Notable for the following components:    POCT Glucose 167 (*)     All other components within normal limits   LIPASE   TROPONIN I   PROTIME-INR     EKG Readings: (Independently Interpreted)   61 bpm. NSR. Normal axis. No STEMI.     ECG Results              EKG 12-lead (Final result)        Collection Time Result Time QRS Duration OHS QTC Calculation    02/29/24 08:33:33 02/29/24 13:19:51 82 410                     Final result by Interface, Lab In [facility] (02/29/24 13:19:59)                   Narrative:    Test Reason : R10.13,    Vent. Rate : 061 BPM     Atrial Rate : 061 BPM     P-R Int : 150 ms          QRS Dur : 082 ms      QT Int : 408 ms       P-R-T Axes : 076 024 082 degrees     QTc Int : 410 ms    Normal sinus rhythm  Possible Lateral infarct ,age undetermined  Abnormal ECG  When compared with ECG of 14-SEP-2000 11:28,  Borderline criteria for Lateral infarct are now Present  Nonspecific T wave abnormality no longer evident in Inferior leads  Confirmed by Tono Bolton MD ([id number]) on 2/29/2024 1:19:48 PM    Referred By: [id number]   SELF           Confirmed By:Tono Bolton MD                                  Imaging Results              IR Cholecystostomy (In process)                       US Abdomen Limited (Gallbladder) (Final result)  Result time 02/29/24 11:33:02      Final result by Juve De La Rosa DO (02/29/24 11:33:02)                   Impression:      Findings compatible with acute, calculus cholecystitis.    This report was flagged in Epic as abnormal.      Electronically signed by: Juve De La Rosa  Date:    02/29/2024  Time:    11:33               Narrative:    EXAMINATION:  US ABDOMEN LIMITED    CLINICAL HISTORY:  Upper abdominal pain, unspecified    TECHNIQUE:  Limited ultrasound of the right upper quadrant of the abdomen (including pancreas, liver, gallbladder, common bile  duct, and spleen) was performed.    COMPARISON:  None.    FINDINGS:  Liver: Partially imaged portions of the hepatic parenchyma are unremarkable.    Gallbladder: The gallbladder is distended.  There is gallbladder wall thickening, measuring up to 6 mm.  There are numerous gallstones, the largest of which measures 1 cm.  Sonographic Dyer sign is positive.  There is no gallbladder wall hyperemia.  There is pericholecystic fluid.    Biliary system: The common duct is not dilated, measuring 5 mm.  No intrahepatic ductal dilatation.    Right kidney: No hydronephrosis.    Miscellaneous: No upper abdominal ascites.                                        CT Abdomen Pelvis With IV Contrast NO Oral Contrast (Final result)  Result time 02/29/24 11:31:31      Final result by Juve De La Rosa DO (02/29/24 11:31:31)                   Impression:      Findings compatible with acute, calculus cholecystitis.    This report was flagged in Epic as abnormal.      Electronically signed by: Juve De La Rosa  Date:    02/29/2024  Time:    11:31               Narrative:    EXAMINATION:  CT ABDOMEN PELVIS WITH IV CONTRAST    CLINICAL HISTORY:  Abdominal pain, acute, nonlocalized;epigastric/RUQ pain;    TECHNIQUE:  Axial CT images with sagittal and coronal reformats were obtained of the abdomen and pelvis from the hemidiaphragms through the symphysis pubis after the administration of 75mL Omnipaque 350.    COMPARISON:  None available.    FINDINGS:  Lung Bases: There is a calcified granuloma in the left lower lobe.    Heart: Heart size is normal.  No pericardial effusion.    Liver: There is enhancement of the hepatic parenchyma adjacent to the gallbladder, compatible with reactive inflammation.  The liver is otherwise unremarkable.  The portal vasculature is patent.    Biliary tract: No intrahepatic or extrahepatic biliary ductal dilatation.    Gallbladder: The gallbladder is distended.  There are multiple gallstones, including a gallstone  in the neck.  There is pericholecystic haziness and inflammation.    Pancreas: Normal. No pancreatic ductal dilatation.    Spleen: Normal size without focal lesion.    Adrenals: Unremarkable.    Kidneys and urinary collecting systems: Normal.  No hydronephrosis or urolithiasis.    Lymph nodes: None enlarged.    Stomach and bowel: The stomach is normal.  Loops of small and large bowel are normal in caliber without evidence for inflammation or obstruction.  The appendix is normal.    Peritoneum and mesentery: No ascites or free intraperitoneal air.  No abdominal fluid collection.    Vasculature: There is moderate atherosclerosis.  There is no aneurysm.    Urinary bladder: No wall thickening.    Reproductive organs: The prostate is not enlarged.    Body wall: No abnormality.    Musculoskeletal: No aggressive osseous lesion.  There are degenerative changes.                                       Medications   midazolam (VERSED) 1 mg/mL injection (1 mg Intravenous Given 2/29/24 1623)   fentaNYL 50 mcg/mL injection (50 mcg Intravenous Given 2/29/24 1623)   ondansetron injection 4 mg (4 mg Intravenous Given 2/29/24 0902)   morphine injection 4 mg (4 mg Intravenous Given 2/29/24 0902)   sodium chloride 0.9% bolus 500 mL 500 mL (0 mLs Intravenous Stopped 2/29/24 1128)   iohexoL (OMNIPAQUE 350) injection 75 mL (75 mLs Intravenous Given 2/29/24 1043)   piperacillin-tazobactam (ZOSYN) 4.5 g in dextrose 5 % in water (D5W) 100 mL IVPB (MB+) (0 g Intravenous Stopped 2/29/24 1253)   0.9%  NaCl infusion (1,000 mLs Intravenous New Bag 2/29/24 1224)     Medical Decision Making  74 y/o M who appears to be uncomfortable presents to the ED with wife at bedside c/o upper abdominal pain. ABCs intact. Initial triage vitals reveal HTN and otherwise unremarkable.    Ddx includes but is not limited to gastritis, pancreatitis, ACS, cardiac arrhytmia, cholecystitis, appendicitis, electrolyte abnormality, anemia    Amount and/or Complexity of Data  Reviewed  Labs: ordered. Decision-making details documented in ED Course.     Details: CBC with a white blood cell count of 19.5.  Radiology: ordered. Decision-making details documented in ED Course.    Risk  Prescription drug management.  Decision regarding hospitalization.               ED Course as of 02/29/24 1624   u Feb 29, 2024   0938 Troponin I: <0.006  Unlikely ACS in the setting of no active CP or SOB and a normal EKG. [BG]   0938 Lipase: 38  Unlikely pancreatitis. [BG]   0939 WBC(!): 19.50  New leukocytosis.  [BG]   0939 Immature Granulocytes(!): 0.6 [BG]   0939 CO2(!): 19  Could be related to a mild dehydration -- will give IVF. [BG]   1027 Urinalysis Microscopic(!)  Not indicative of a UTI. [BG]   1134 CT Abdomen Pelvis With IV Contrast NO Oral Contrast(!)  Findings compatible with acute, calculus cholecystitis. [BG]   1135 US Abdomen Limited (Gallbladder)(!)  Findings compatible with acute, calculus cholecystitis. [BG]   1253 Discussed the case with general surgery and the since the patient has been on chronic plavix they will not proceed. They recommend HM admission and IR israel tube. Will discuss with HM. Will give patient a dose of zosyn in the ED. [BG]   1339 Discussed the case with [facility] for admission as general surgery will not take this patient as he is on chronic plavix. Placed a consult to IR for israel tube. [BG]   1353 [facility] unable to admit this patient due to their surgical diagnosis but they are happy to be consulted. Will discuss with general surgery. [BG]      ED Course User Index  [BG] Dorota Weiss MD          1610 discussed with the [facility] hospitalist, who will admit.                 Clinical Impression:  Final diagnoses:  [R10.13] Epigastric pain  [R10.10] Upper abdominal pain  [K81.9] Cholecystitis (Primary)          ED Disposition Condition    Admit Stable                Dorota Weiss MD  Resident  02/29/24 1537       Young Steve MD  02/29/24 1624

## 2024-02-29 NOTE — HPI
Yoan Coyne is a 73 y.o. male with a history of CAD (s/p stenting in 2011), DM2, and HTN who presents to the ED with ~ 1 week of abdominal pain associated with N/V. The patient states that about a week ago he had some catfish after which he developed abdominal pain, nausea, and vomiting. This pain was intermittent over the 3 days following this. It eventually resolved until last night when he began to experience sever abdominal pain, nausea, and vomiting. States that he woke this morning with a fever and decided to present to the ED. In the ED, the patient is afebrile and hemodynamically stable. His lab work is remarkable for a leukocytosis of 19.5 and hyperglycemia (in the setting of known DM2). RUQ US showing cholelithiasis, gallbladder wall thickening, and pericholecystic fluid. CT A/P with similar findings including a stone in the neck of the gallbladder. No intra or extrahepatic biliary ductal dilation. Of note, the patient is currently taking plavix, last dose today.

## 2024-02-29 NOTE — SEDATION DOCUMENTATION
Procedure complete. Patient alert and oriented x4 unlabored on Room Air. Patient denies pain and is resting comfortably. Surgical site dressed with gauze and tagederm. Dressing is clean, dry, and intact. [facility] Patient transported to ER. Report given to Lala IBANEZ.

## 2024-02-29 NOTE — SUBJECTIVE & OBJECTIVE
No current facility-administered medications on file prior to encounter.     Current Outpatient Medications on File Prior to Encounter   Medication Sig    aspirin (ECOTRIN) 81 MG EC tablet Take 81 mg by mouth once daily.    atorvastatin (LIPITOR) 40 MG tablet Take 40 mg by mouth once daily.    clopidogreL (PLAVIX) 75 mg tablet Take 75 mg by mouth once daily.    glyBURIDE (DIABETA) 5 MG tablet Take 5 mg by mouth 2 (two) times daily.    ibuprofen (ADVIL,MOTRIN) 200 MG tablet Take 200 mg by mouth every 6 (six) hours as needed for Pain.    metFORMIN (GLUCOPHAGE) 1000 MG tablet Take 1,000 mg by mouth every morning. Take with 1/2 tablet of 500 mg(1250 mg)    metFORMIN (GLUCOPHAGE) 500 MG tablet Take 250 mg by mouth 2 (two) times a day.    metoprolol tartrate (LOPRESSOR) 100 MG tablet Take 50 mg by mouth 2 (two) times daily.    ramipriL (ALTACE) 2.5 MG capsule Take 2.5 mg by mouth every evening.    [DISCONTINUED] glyBURIDE (DIABETA) 2.5 MG tablet Take 2.5 mg by mouth 2 (two) times daily.       Review of patient's allergies indicates:  No Known Allergies    Past Medical History:   Diagnosis Date    Coronary artery disease     Diabetes mellitus     Hypertension      No past surgical history on file.  Family History    None       Tobacco Use    Smoking status: Not on file    Smokeless tobacco: Not on file   Substance and Sexual Activity    Alcohol use: Not on file    Drug use: Not on file    Sexual activity: Not on file     Review of Systems   Constitutional:  Positive for fever (subjective). Negative for chills.   HENT:  Negative for trouble swallowing and voice change.    Eyes: Negative.    Respiratory:  Negative for chest tightness and shortness of breath.    Cardiovascular:  Negative for chest pain and palpitations.   Gastrointestinal:  Positive for abdominal pain, nausea and vomiting. Negative for abdominal distention.   Endocrine: Negative.    Genitourinary:  Negative for difficulty urinating and flank pain.    Musculoskeletal: Negative.    Skin: Negative.    Neurological: Negative.    Hematological: Negative.    Psychiatric/Behavioral: Negative.       Objective:     Vital Signs (Most Recent):  Temp: 97.5 °F (36.4 °C) (02/29/24 0828)  Pulse: 73 (02/29/24 1200)  Resp: 20 (02/29/24 0902)  BP: (!) 180/86 (02/29/24 1200)  SpO2: 99 % (02/29/24 1200) Vital Signs (24h Range):  Temp:  [97.5 °F (36.4 °C)] 97.5 °F (36.4 °C)  Pulse:  [61-73] 73  Resp:  [18-20] 20  SpO2:  [99 %-100 %] 99 %  BP: (180-193)/(84-86) 180/86     Weight: 76.2 kg (168 lb)  Body mass index is 26.31 kg/m².     Physical Exam  Vitals reviewed.   Constitutional:       General: He is not in acute distress.     Appearance: Normal appearance. He is not toxic-appearing.   HENT:      Head: Normocephalic and atraumatic.      Nose: Nose normal.      Mouth/Throat:      Mouth: Mucous membranes are moist.   Cardiovascular:      Rate and Rhythm: Normal rate.      Pulses: Normal pulses.   Pulmonary:      Effort: Pulmonary effort is normal. No respiratory distress.   Abdominal:      General: Abdomen is flat. There is no distension.      Palpations: Abdomen is soft.      Tenderness: There is abdominal tenderness. There is no guarding or rebound.      Comments: Former skin graft donor site to abdomen, well healed   Mild tenderness to palpation, localizing to the RUQ; no guarding, no rebound, non peritonitic   Skin:     General: Skin is warm.   Neurological:      General: No focal deficit present.      Mental Status: He is alert and oriented to person, place, and time.   Psychiatric:         Mood and Affect: Mood normal.         Behavior: Behavior normal.            I have reviewed all pertinent lab results within the past 24 hours.  CBC:   Recent Labs   Lab 02/29/24  0901   WBC 19.50*   RBC 4.86   HGB 14.8   HCT 42.4      MCV 87   MCH 30.5   MCHC 34.9     CMP:   Recent Labs   Lab 02/29/24  0901   *   CALCIUM 10.0   ALBUMIN 3.6   PROT 7.3      K 4.1   CO2  19*      BUN 14   CREATININE 1.2   ALKPHOS 84   ALT 13   AST 20   BILITOT 0.8       Significant Diagnostics:  I have reviewed all pertinent imaging results/findings within the past 24 hours.    US Abdomen Limited (Gallbladder)  2/29/2024    Findings compatible with acute, calculus cholecystitis.     CT Abdomen Pelvis With IV Contrast NO Oral Contrast  2/29/2024    Findings compatible with acute, calculus cholecystitis.

## 2024-02-29 NOTE — ASSESSMENT & PLAN NOTE
-monitor CBGS   -cover with insulin sliding scale protocol/monitor CBGS   -hold any oral diabetic medications while inpatient

## 2024-02-29 NOTE — ASSESSMENT & PLAN NOTE
-POA   -afebrile   -monitor leukocytosis   -chest x-ray on 02/29/2024= pending   -blood cultures x2 on 02/29/2024= pending   -CT abdomen pelvis on 02/29/2024=acute, calculus cholecystitis.   -general surgery consulted from ER= appreciate input and follow recommendations.  General surgery recommended IR consult for cholecystotomy tube  -IR consulted= follow recommendations   -patient is status post percutaneous cholecystostomy tube placement ON 02/29/2024. Procedure performed by: Patti Soni MD  -NPO until cleared by surgery

## 2024-02-29 NOTE — PHARMACY MED REC
"  Admission Medication History     The home medication history was taken by Esperanza Argueta CPhT.    Medication history obtained from, Patient Verified     You may go to "Admission" then "Reconcile Home Medications" tabs to review and/or act upon these items.     The home medication list has been updated by the Pharmacy department.   Please read ALL comments highlighted in yellow.   Please address this information as you see fit.    Feel free to contact us if you have any questions or require assistance.        Esperanza Argueta CPhT.  Ext [phone]             .          "

## 2024-02-29 NOTE — PROGRESS NOTES
Pharmacist Renal Dose Adjustment Note    Yoan Coyne is a 73 y.o. male being treated with the medication Zosyn    Patient Data:    Vital Signs (Most Recent):  Temp: 98.2 °F (36.8 °C) (02/29/24 1706)  Pulse: 81 (02/29/24 1706)  Resp: 20 (02/29/24 1706)  BP: (!) 161/72 (02/29/24 1706)  SpO2: 100 % (02/29/24 1706) Vital Signs (72h Range):  Temp:  [97.5 °F (36.4 °C)-98.2 °F (36.8 °C)]   Pulse:  [61-81]   Resp:  [17-20]   BP: (156-193)/(72-86)   SpO2:  [99 %-100 %]      Recent Labs   Lab 02/29/24 0901   CREATININE 1.2     Serum creatinine: 1.2 mg/dL 02/29/24 0901  Estimated creatinine clearance: 52.8 mL/min    Medication:Zosyn dose: 3.375 g frequency q8h will be changed to medication:Zosyn dose:4.5 g frequency:q8h    Pharmacist's Name: Darek Jimenez  Pharmacist's Extension: [phone]

## 2024-02-29 NOTE — ED NOTES
Pt presents to ED with epigastric abd pain x 1 day. Pain is sharp and constant. Pain is associated with n/v. Emesis is described as yellow liquid.     Pt is alert, age appropriate and in no acute distress. Respirations are even and unlabored. Bilateral breath sounds are clear throughout chest. abd is soft, diffusely tender and not distended. pt denies change in feeding, bowel or bladder habits. States he worked in the yard yesterday. Skin is warm and color is appropriate for ethnicity. Pt moves all extremities well. Conjunctivae normal. Pt is dressed appropriately and well groomed.

## 2024-02-29 NOTE — H&P
[facility] Emergency [facility] Medicine  History & Physical    Patient Name: Yoan Coyne  MRN: [id number]  Patient Class: Emergency  Admission Date: 2/29/2024  Attending Physician: Terry Fuentes MD   Primary Care Provider: Charleen, Primary Doctor         Patient information was obtained from patient, spouse/SO, and ER records.     Subjective:     Principal Problem:<principal problem not specified>    Chief Complaint: No chief complaint on file.       HPI: This is a 73-year-old  male who comes in with abdominal pain that started about a week ago initially.  It became better and patient was doing fine until this morning when he woke up and had sudden onset abdominal pain with some nausea vomiting.  Therefore we decided to come to the ER for further evaluation and treatment.  CT imaging suspicious for calculus cholecystitis.  General surgery was consulted.  General surgery recommended IR consult for percutaneous intervention and no surgical intervention.  IR consulted and patient is now status post right upper quadrant cholecystostomy tube placement .  Patient's wife by bedside also helps provide history.  Patient is awake alert and oriented x3 and in no acute distress.  Denies chest pain shortness on breath fever chills, dysuria hematuria, blood in stools black stools, loss of consciousness or seizures.  Patient admitted to hospitalist services    Past Medical History:   Diagnosis Date    Coronary artery disease     Diabetes mellitus     Hypertension        No past surgical history on file.    Review of patient's allergies indicates:  No Known Allergies    No current facility-administered medications on file prior to encounter.     Current Outpatient Medications on File Prior to Encounter   Medication Sig    aspirin (ECOTRIN) 81 MG EC tablet Take 81 mg by mouth once daily.    atorvastatin (LIPITOR) 40 MG tablet Take 40 mg by mouth once daily.    clopidogreL (PLAVIX) 75 mg tablet Take 75 mg by mouth once  daily.    glyBURIDE (DIABETA) 5 MG tablet Take 5 mg by mouth 2 (two) times daily.    ibuprofen (ADVIL,MOTRIN) 200 MG tablet Take 200 mg by mouth every 6 (six) hours as needed for Pain.    metFORMIN (GLUCOPHAGE) 1000 MG tablet Take 1,000 mg by mouth every morning. Take with 1/2 tablet of 500 mg(1250 mg)    metFORMIN (GLUCOPHAGE) 500 MG tablet Take 250 mg by mouth 2 (two) times a day.    metoprolol tartrate (LOPRESSOR) 100 MG tablet Take 50 mg by mouth 2 (two) times daily.    ramipriL (ALTACE) 2.5 MG capsule Take 2.5 mg by mouth every evening.    [DISCONTINUED] glyBURIDE (DIABETA) 2.5 MG tablet Take 2.5 mg by mouth 2 (two) times daily.     Family History    None       Tobacco Use    Smoking status: Not on file    Smokeless tobacco: Not on file   Substance and Sexual Activity    Alcohol use: Not on file    Drug use: Not on file    Sexual activity: Not on file     Review of Systems   Constitutional: Negative.    HENT: Negative.     Eyes: Negative.    Respiratory: Negative.     Cardiovascular: Negative.    Gastrointestinal:  Positive for abdominal pain, nausea and vomiting.   Endocrine: Negative.    Genitourinary: Negative.    Musculoskeletal: Negative.    Skin: Negative.    Allergic/Immunologic: Negative.    Neurological: Negative.    Hematological: Negative.    Psychiatric/Behavioral: Negative.     All other systems reviewed and are negative.    Objective:     Vital Signs (Most Recent):  Temp: 98.2 °F (36.8 °C) (02/29/24 1706)  Pulse: 81 (02/29/24 1706)  Resp: 20 (02/29/24 1706)  BP: (!) 161/72 (02/29/24 1706)  SpO2: 100 % (02/29/24 1706) Vital Signs (24h Range):  Temp:  [97.5 °F (36.4 °C)-98.2 °F (36.8 °C)] 98.2 °F (36.8 °C)  Pulse:  [61-81] 81  Resp:  [17-20] 20  SpO2:  [99 %-100 %] 100 %  BP: (156-193)/(72-86) 161/72     Weight: 76.2 kg (168 lb)  Body mass index is 26.31 kg/m².     Physical Exam  Vitals and nursing note reviewed.   Constitutional:       General: He is not in acute distress.     Appearance: Normal  appearance.   HENT:      Head: Normocephalic and atraumatic.      Nose: Nose normal.      Mouth/Throat:      Mouth: Mucous membranes are moist.   Eyes:      Extraocular Movements: Extraocular movements intact.      Pupils: Pupils are equal, round, and reactive to light.   Cardiovascular:      Rate and Rhythm: Normal rate and regular rhythm.      Pulses: Normal pulses.      Heart sounds: Normal heart sounds.   Pulmonary:      Effort: Pulmonary effort is normal. No respiratory distress.      Breath sounds: Normal breath sounds.   Abdominal:      General: Bowel sounds are normal.      Palpations: Abdomen is soft.      Comments: Right upper quadrant drain in place.  No bleeding discharge or erythema seen around surgical site.  Tenderness to right upper quadrant   Musculoskeletal:         General: No deformity. Normal range of motion.      Cervical back: Normal range of motion and neck supple.      Right lower leg: No edema.      Left lower leg: No edema.   Skin:     General: Skin is warm and dry.   Neurological:      General: No focal deficit present.      Mental Status: He is alert and oriented to person, place, and time. Mental status is at baseline.      Cranial Nerves: No cranial nerve deficit.   Psychiatric:         Mood and Affect: Mood normal.              CRANIAL NERVES     CN III, IV, VI   Pupils are equal, round, and reactive to light.       Significant Labs: All pertinent labs within the past 24 hours have been reviewed.    Significant Imaging: I have reviewed all pertinent imaging results/findings within the past 24 hours.  Assessment/Plan:     Coronary artery disease without angina pectoris  -patient has a history of CABG   -stable.  Patient denies chest pain or shortness for breath   -EKG=Normal sinus rhythm. Possible Lateral infarct ,age undetermined   -hold aspirin and Plavix until cleared by surgery  -continue statin and beta-blocker when tolerating oral diet    Type 2 diabetes mellitus without  complication, without long-term current use of insulin  -monitor CBGS   -cover with insulin sliding scale protocol/monitor CBGS   -hold any oral diabetic medications while inpatient    Primary hypertension  -monitor blood pressure   -covered with hydralazine 10 mg IV p.r.n.   -adjust BP medications needed when tolerating oral diet    Acute cholecystitis due to biliary calculus  -POA   -afebrile   -monitor leukocytosis   -chest x-ray on 02/29/2024= pending   -blood cultures x2 on 02/29/2024= pending   -CT abdomen pelvis on 02/29/2024=acute, calculus cholecystitis.   -general surgery consulted from ER= appreciate input and follow recommendations.  General surgery recommended IR consult for cholecystotomy tube  -IR consulted= follow recommendations   -patient is status post percutaneous cholecystostomy tube placement ON 02/29/2024. Procedure performed by: Patti Soni MD  -NPO until cleared by surgery        VTE Risk Mitigation (From admission, onward)      None                       Pharmacist Renal Dose Adjustment Note    Yoan Coyne is a 73 y.o. male being treated with the medication Zosyn    Patient Data:    Vital Signs (Most Recent):  Temp: 98.2 °F (36.8 °C) (02/29/24 1706)  Pulse: 81 (02/29/24 1706)  Resp: 20 (02/29/24 1706)  BP: (!) 161/72 (02/29/24 1706)  SpO2: 100 % (02/29/24 1706) Vital Signs (72h Range):  Temp:  [97.5 °F (36.4 °C)-98.2 °F (36.8 °C)]   Pulse:  [61-81]   Resp:  [17-20]   BP: (156-193)/(72-86)   SpO2:  [99 %-100 %]      Recent Labs   Lab 02/29/24  0901   CREATININE 1.2     Serum creatinine: 1.2 mg/dL 02/29/24 0901  Estimated creatinine clearance: 52.8 mL/min    Medication:Zosyn dose: 3.375 g frequency q8h will be changed to medication:Zosyn dose:4.5 g frequency:q8h    Pharmacist's Name: Darek Jimenez  Pharmacist's Extension: [id number]      Terry Fuentes MD  Department of Hospital Medicine  [place] - Emergency Dept

## 2024-03-01 VITALS
BODY MASS INDEX: 26.85 KG/M2 | HEART RATE: 96 BPM | SYSTOLIC BLOOD PRESSURE: 117 MMHG | DIASTOLIC BLOOD PRESSURE: 60 MMHG | HEIGHT: 67 IN | WEIGHT: 171.06 LBS | TEMPERATURE: 98 F | RESPIRATION RATE: 20 BRPM | OXYGEN SATURATION: 98 %

## 2024-03-01 PROBLEM — K81.9 CHOLECYSTITIS: Status: RESOLVED | Noted: 2024-02-29 | Resolved: 2024-03-01

## 2024-03-01 PROBLEM — E83.42 HYPOMAGNESEMIA: Status: ACTIVE | Noted: 2024-03-01

## 2024-03-01 LAB
ALBUMIN SERPL BCP-MCNC: 2.8 G/DL (ref 3.5–5.2)
ALP SERPL-CCNC: 71 U/L (ref 55–135)
ALT SERPL W/O P-5'-P-CCNC: 26 U/L (ref 10–44)
ANION GAP SERPL CALC-SCNC: 9 MMOL/L (ref 8–16)
AST SERPL-CCNC: 30 U/L (ref 10–40)
BASOPHILS # BLD AUTO: 0.06 K/UL (ref 0–0.2)
BASOPHILS NFR BLD: 0.6 % (ref 0–1.9)
BILIRUB SERPL-MCNC: 1 MG/DL (ref 0.1–1)
BUN SERPL-MCNC: 12 MG/DL (ref 8–23)
CALCIUM SERPL-MCNC: 8.9 MG/DL (ref 8.7–10.5)
CHLORIDE SERPL-SCNC: 106 MMOL/L (ref 95–110)
CO2 SERPL-SCNC: 23 MMOL/L (ref 23–29)
CREAT SERPL-MCNC: 1.1 MG/DL (ref 0.5–1.4)
DIFFERENTIAL METHOD BLD: ABNORMAL
EOSINOPHIL # BLD AUTO: 0.5 K/UL (ref 0–0.5)
EOSINOPHIL NFR BLD: 5.3 % (ref 0–8)
ERYTHROCYTE [DISTWIDTH] IN BLOOD BY AUTOMATED COUNT: 12.6 % (ref 11.5–14.5)
EST. GFR  (NO RACE VARIABLE): >60 ML/MIN/1.73 M^2
GLUCOSE SERPL-MCNC: 165 MG/DL (ref 70–110)
HCT VFR BLD AUTO: 36.1 % (ref 40–54)
HGB BLD-MCNC: 12.4 G/DL (ref 14–18)
IMM GRANULOCYTES # BLD AUTO: 0.04 K/UL (ref 0–0.04)
IMM GRANULOCYTES NFR BLD AUTO: 0.4 % (ref 0–0.5)
LYMPHOCYTES # BLD AUTO: 2.3 K/UL (ref 1–4.8)
LYMPHOCYTES NFR BLD: 23.7 % (ref 18–48)
MAGNESIUM SERPL-MCNC: 1.4 MG/DL (ref 1.6–2.6)
MCH RBC QN AUTO: 30.2 PG (ref 27–31)
MCHC RBC AUTO-ENTMCNC: 34.3 G/DL (ref 32–36)
MCV RBC AUTO: 88 FL (ref 82–98)
MONOCYTES # BLD AUTO: 1.5 K/UL (ref 0.3–1)
MONOCYTES NFR BLD: 15.4 % (ref 4–15)
NEUTROPHILS # BLD AUTO: 5.3 K/UL (ref 1.8–7.7)
NEUTROPHILS NFR BLD: 54.6 % (ref 38–73)
NRBC BLD-RTO: 0 /100 WBC
PHOSPHATE SERPL-MCNC: 2.9 MG/DL (ref 2.7–4.5)
PLATELET # BLD AUTO: 331 K/UL (ref 150–450)
PMV BLD AUTO: 8.9 FL (ref 9.2–12.9)
POCT GLUCOSE: 148 MG/DL (ref 70–110)
POCT GLUCOSE: 155 MG/DL (ref 70–110)
POCT GLUCOSE: 172 MG/DL (ref 70–110)
POCT GLUCOSE: 195 MG/DL (ref 70–110)
POTASSIUM SERPL-SCNC: 3.9 MMOL/L (ref 3.5–5.1)
PROT SERPL-MCNC: 5.9 G/DL (ref 6–8.4)
RBC # BLD AUTO: 4.11 M/UL (ref 4.6–6.2)
SODIUM SERPL-SCNC: 138 MMOL/L (ref 136–145)
WBC # BLD AUTO: 9.77 K/UL (ref 3.9–12.7)

## 2024-03-01 PROCEDURE — 80053 COMPREHEN METABOLIC PANEL: CPT | Performed by: INTERNAL MEDICINE

## 2024-03-01 PROCEDURE — 83735 ASSAY OF MAGNESIUM: CPT | Performed by: INTERNAL MEDICINE

## 2024-03-01 PROCEDURE — 84100 ASSAY OF PHOSPHORUS: CPT | Performed by: INTERNAL MEDICINE

## 2024-03-01 PROCEDURE — 99232 SBSQ HOSP IP/OBS MODERATE 35: CPT | Mod: ,,, | Performed by: STUDENT IN AN ORGANIZED HEALTH CARE EDUCATION/TRAINING PROGRAM

## 2024-03-01 PROCEDURE — 63600175 PHARM REV CODE 636 W HCPCS: Performed by: INTERNAL MEDICINE

## 2024-03-01 PROCEDURE — 85025 COMPLETE CBC W/AUTO DIFF WBC: CPT | Performed by: INTERNAL MEDICINE

## 2024-03-01 PROCEDURE — 36415 COLL VENOUS BLD VENIPUNCTURE: CPT | Performed by: INTERNAL MEDICINE

## 2024-03-01 PROCEDURE — 25000003 PHARM REV CODE 250: Performed by: INTERNAL MEDICINE

## 2024-03-01 PROCEDURE — 11000001 HC ACUTE MED/SURG PRIVATE ROOM

## 2024-03-01 RX ORDER — METRONIDAZOLE 500 MG/1
500 TABLET ORAL 3 TIMES DAILY
Qty: 30 TABLET | Refills: 0 | Status: SHIPPED | OUTPATIENT
Start: 2024-03-01 | End: 2024-03-11

## 2024-03-01 RX ORDER — LANOLIN ALCOHOL/MO/W.PET/CERES
400 CREAM (GRAM) TOPICAL 2 TIMES DAILY
Qty: 2 TABLET | Refills: 0 | Status: SHIPPED | OUTPATIENT
Start: 2024-03-02 | End: 2024-03-03

## 2024-03-01 RX ORDER — CIPROFLOXACIN 500 MG/1
500 TABLET ORAL EVERY 12 HOURS
Status: DISCONTINUED | OUTPATIENT
Start: 2024-03-01 | End: 2024-03-01 | Stop reason: HOSPADM

## 2024-03-01 RX ORDER — CIPROFLOXACIN 500 MG/1
500 TABLET ORAL 2 TIMES DAILY
Qty: 20 TABLET | Refills: 0 | Status: SHIPPED | OUTPATIENT
Start: 2024-03-01 | End: 2024-03-11

## 2024-03-01 RX ORDER — HYDROCODONE BITARTRATE AND ACETAMINOPHEN 5; 325 MG/1; MG/1
1 TABLET ORAL EVERY 8 HOURS PRN
Qty: 21 TABLET | Refills: 0 | Status: SHIPPED | OUTPATIENT
Start: 2024-03-01 | End: 2024-03-08

## 2024-03-01 RX ORDER — METRONIDAZOLE 500 MG/1
500 TABLET ORAL EVERY 8 HOURS
Status: DISCONTINUED | OUTPATIENT
Start: 2024-03-01 | End: 2024-03-01 | Stop reason: HOSPADM

## 2024-03-01 RX ORDER — MAGNESIUM SULFATE HEPTAHYDRATE 40 MG/ML
2 INJECTION, SOLUTION INTRAVENOUS ONCE
Status: COMPLETED | OUTPATIENT
Start: 2024-03-01 | End: 2024-03-01

## 2024-03-01 RX ADMIN — METRONIDAZOLE 500 MG: 500 TABLET ORAL at 03:03

## 2024-03-01 RX ADMIN — SODIUM CHLORIDE: 9 INJECTION, SOLUTION INTRAVENOUS at 04:03

## 2024-03-01 RX ADMIN — CIPROFLOXACIN 500 MG: 500 TABLET, FILM COATED ORAL at 03:03

## 2024-03-01 RX ADMIN — MAGNESIUM SULFATE HEPTAHYDRATE 2 G: 40 INJECTION, SOLUTION INTRAVENOUS at 03:03

## 2024-03-01 RX ADMIN — PIPERACILLIN AND TAZOBACTAM 4.5 G: 4; .5 INJECTION, POWDER, LYOPHILIZED, FOR SOLUTION INTRAVENOUS; PARENTERAL at 06:03

## 2024-03-01 NOTE — ASSESSMENT & PLAN NOTE
-patient has a history of CABG   -stable.  Patient denies chest pain or shortness for breath   -EKG=Normal sinus rhythm. Possible Lateral infarct ,age undetermined   -resume aspirin and Plavix (okay to resume per surgery)  -continue statin and beta-blocker when tolerating oral diet

## 2024-03-01 NOTE — CONSULTS
Please see consult note written earlier today.     Masha Rodriguez MD  Pager: [phone]  General Surgery PGY-III  [facility]     Subjective   Paco Gunderson is a 71 y.o. male with PMH of HTN, COVID x2 last in 05/2022, BPH, arthritis, CKD, PACs/PVCs, and GERD, who saw me initially for evaluation of palpitation.  EKG at the time of palpitation showed PACs and PVCs.  His heart monitor from November of 2022 with sinus rhythm, 1% of PVCs and 3% PACs.  Patient is here because of an incident yesterday.  He describes waking up 2 days ago at 5 AM while the curtains were pulled in, he did not realize that and got scared things that he is having black vision, he panicked per his description and subsequently he felt weak and started crawling on the floor, finally was able to see the lights when he got close to the door, at which time he regained his strength and was able to stand up.  Patient is very active, goes to the gym multiple times a week, swims. He works in the medical equipment repair, he has been also working in his house to build an extension wall.  Patient denies any chest pain, shortness of breath or any other cardiac symptoms.  Blood pressure is elevated today at 159/80 mmHg.  Patient is on metoprolol 25 mg twice daily and lisinopril 10 mg once daily.    Review of Systems  ROS is negative other than in HPI.      Objective   Physical Exam  General: NAD  HEENT: IEOM, PERRL   Neck: No JVD or carotid bruit  Lungs: CTAB  Heart: RRR, normal S1 and S2, no loud murmurs  Abdomen: Soft, nontender, positive bowel sounds  Extremities: No edema  Neurologic: No FND  Psychiatric: Normal mood and affect    Assessment/Plan     1-an episode of weakness:  -See HPI for details, it does not appear cardiac in origin, it appears related to panicking.  Further work-up as per PCP.    2-palpitation:  -EKG in the office on 8/21/2022 while having palpitation showed PACs and PVCs.  -Holter monitor in November of 2022 with sinus rhythm, 1% PVCs, 3% PACs.  -Continue metoprolol 25 mg twice daily.    3-HTN:  -Still uncontrolled, continue metoprolol and we will increase  lisinopril to 20 mg once daily [has CKD].    4-health maintenance:  -We will check lipid panel.  Last in 04/2022 with HDL of 47, LDL was not reported.    RTC in 12 months.         Rudy Payne MD

## 2024-03-01 NOTE — ASSESSMENT & PLAN NOTE
73M with a history of CAD (s/p stenting in 2011, on plavix), DM2, and HTN who presents with ~1 week of abdominal pain. Lab work and imaging suggestive of acute cholelithiasis. Given that the patient is taking plavix, would recommend IR consultation for cholecystostomy tube placement in the acute setting. Can see the patient in the outpatient setting and plan for elective cholecystectomy at a later date, when his plavix can be held.     -- doing well  -- will plan to see in clinic as an outpatient to discuss interval, cholecystectomy

## 2024-03-01 NOTE — PLAN OF CARE
AOX4. VS stable. Safety maintained. Meds given per MAR. Denies pain or N/V at this time. Blood glucose monitored. IV fluids infusing per orders. Biliary drain to RUQ draining brown drainage. Drain flushed per orders. Cardiac monitoring in place. Cardiac diet maintained. Resting quietly. SR up X 2. Call light in reach. Bed alarm set. Pt denies any further needs at this time. Plan of care ongoing.       Problem: Adult Inpatient Plan of Care  Goal: Plan of Care Review  Outcome: Ongoing, Progressing  Goal: Patient-Specific Goal (Individualized)  Outcome: Ongoing, Progressing     Problem: Diabetes Comorbidity  Goal: Blood Glucose Level Within Targeted Range  Outcome: Ongoing, Progressing     Problem: Pain Acute  Goal: Acceptable Pain Control and Functional Ability  Outcome: Ongoing, Progressing     Problem: Nausea and Vomiting  Goal: Fluid and Electrolyte Balance  Outcome: Ongoing, Progressing     Problem: Hypertension Comorbidity  Goal: Blood Pressure in Desired Range  Outcome: Ongoing, Progressing

## 2024-03-01 NOTE — ASSESSMENT & PLAN NOTE
-POA   -improved clinically  -afebrile   -monitor leukocytosis = resolved  -chest x-ray on 02/29/2024= no acute abnormality  -blood cultures x2 on 02/29/2024= negative so far  -CT abdomen pelvis on 02/29/2024=acute, calculus cholecystitis.   -general surgery consulted from ER= appreciate input and follow recommendations.  General surgery recommended IR consult for cholecystotomy tube  -IR consulted= follow recommendations   -patient is status post percutaneous cholecystostomy tube placement on 02/29/2024. Procedure performed by: Patti Soni MD  -diet advanced per General surgery and patient tolerating oral diet well  -antiplatelets have been resumed per General surgery recommendations  -general surgery has cleared patient for discharge home with cholecystostomy tube with outpatient follow-up

## 2024-03-01 NOTE — PROGRESS NOTES
"[facility]           Pharmacy  Admission Medication Reconciliation     Based on information gathered for medication list, you may go to "Admission" then "Reconcile Home Medications" tabs to review and/or act upon those items.     The home medication list has been updated by the Pharmacy department.   Please read ALL comments highlighted in red.   Please address this information as you see fit.    Feel free to contact us if you have any questions or require assistance.    Home medication list has been compared to current inpatient medications. Please review the following discrepancies noted below:      Patient reports STILL TAKING the following medication(s) which was not ordered upon admit  Aspirin  Atorvastatin  Clopidogrel  Metoprolol  Ramipril    Feel free to contact us if you have any questions or require assistance.    Mary Ojeda, PharmD  [phone]        "

## 2024-03-01 NOTE — PROGRESS NOTES
Discharge orders noted. Additional clinical references attached.    Patient's discharge instructions given by bedside RN and reviewed via this VN.  Education provided on new medication, diagnosis, and follow-up appointments.  Teach back method used. Patient verbalized understanding. All questions answered. Transport to [facility] requested. Floor nurse notified.      03/01/24 6456   AVS Confirmation   Discharge instructions and AVS given to and reviewed with patient and/or significant other. Yes

## 2024-03-01 NOTE — ED NOTES
Provider messaged for pain medication. Pain 4/10 at rest. 8/10 with movement, deep inspiration, and belching.

## 2024-03-01 NOTE — PROGRESS NOTES
[facility]  General Surgery  Progress Note    Subjective:     History of Present Illness:  Yoan Coyne is a 73 y.o. male with a history of CAD (s/p stenting in 2011), DM2, and HTN who presents to the ED with ~ 1 week of abdominal pain associated with N/V. The patient states that about a week ago he had some catfish after which he developed abdominal pain, nausea, and vomiting. This pain was intermittent over the 3 days following this. It eventually resolved until last night when he began to experience sever abdominal pain, nausea, and vomiting. States that he woke this morning with a fever and decided to present to the ED. In the ED, the patient is afebrile and hemodynamically stable. His lab work is remarkable for a leukocytosis of 19.5 and hyperglycemia (in the setting of known DM2). RUQ US showing cholelithiasis, gallbladder wall thickening, and pericholecystic fluid. CT A/P with similar findings including a stone in the neck of the gallbladder. No intra or extrahepatic biliary ductal dilation. Of note, the patient is currently taking plavix, last dose today.       Post-Op Info:  * No surgery found *         Interval History:   IR cholecystostomy tube placed yesterday. Pain well controlled. T bili and LFTs remain normal.     Medications:  Continuous Infusions:   sodium chloride 0.9% 100 mL/hr at 03/01/24 0612     Scheduled Meds:   piperacillin-tazobactam (Zosyn) IV (PEDS and ADULTS) (extended infusion is not appropriate)  4.5 g Intravenous Q8H     PRN Meds:dextrose 10%, dextrose 10%, glucagon (human recombinant), hydrALAZINE, ondansetron     Review of patient's allergies indicates:  No Known Allergies  Objective:     Vital Signs (Most Recent):  Temp: 97.9 °F (36.6 °C) (03/01/24 0345)  Pulse: 71 (03/01/24 0534)  Resp: 18 (03/01/24 0345)  BP: 100/60 (03/01/24 0345)  SpO2: 97 % (03/01/24 0345) Vital Signs (24h Range):  Temp:  [97.5 °F (36.4 °C)-99.7 °F (37.6 °C)] 97.9 °F (36.6 °C)  Pulse:  [61-91]  71  Resp:  [16-20] 18  SpO2:  [96 %-100 %] 97 %  BP: ()/(55-86) 100/60     Weight: 77.6 kg (171 lb 1.2 oz)  Body mass index is 26.79 kg/m².    Intake/Output - Last 3 Shifts         02/28 0700  02/29 0659 02/29 0700  03/01 0659 03/01 0700  03/02 0659    P.O.  120     I.V. (mL/kg)  612 (7.9)     IV Piggyback  99.9     Total Intake(mL/kg)  831.9 (10.7)     Urine (mL/kg/hr)  425     Drains  80 60    Total Output  505 60    Net  +326.9 -60           Urine Occurrence  400 x              Physical Exam  Vitals reviewed.   Constitutional:       General: He is not in acute distress.     Appearance: Normal appearance. He is not toxic-appearing.   HENT:      Head: Normocephalic and atraumatic.      Nose: Nose normal.      Mouth/Throat:      Mouth: Mucous membranes are moist.   Cardiovascular:      Rate and Rhythm: Normal rate.      Pulses: Normal pulses.   Pulmonary:      Effort: Pulmonary effort is normal. No respiratory distress.   Abdominal:      General: Abdomen is flat. There is no distension.      Palpations: Abdomen is soft.      Tenderness: There is abdominal tenderness. There is no guarding or rebound.      Comments: Former skin graft donor site to abdomen, well healed   Abdominal tenderness improved now mostly at drain site  Biliary drain to RUQ, bilious output in drainage bag    Skin:     General: Skin is warm.   Neurological:      General: No focal deficit present.      Mental Status: He is alert and oriented to person, place, and time.   Psychiatric:         Mood and Affect: Mood normal.         Behavior: Behavior normal.          Significant Labs:  I have reviewed all pertinent lab results within the past 24 hours.  CBC:   Recent Labs   Lab 03/01/24  0509   WBC 9.77   RBC 4.11*   HGB 12.4*   HCT 36.1*      MCV 88   MCH 30.2   MCHC 34.3     CMP:   Recent Labs   Lab 03/01/24  0509   *   CALCIUM 8.9   ALBUMIN 2.8*   PROT 5.9*      K 3.9   CO2 23      BUN 12   CREATININE 1.1   ALKPHOS  71   ALT 26   AST 30   BILITOT 1.0       Significant Diagnostics:  I have reviewed all pertinent imaging results/findings within the past 24 hours.  Assessment/Plan:     Acute cholecystitis due to biliary calculus  73M with a history of CAD (s/p stenting in 2011, on plavix), DM2, and HTN who presents with ~1 week of abdominal pain. Lab work and imaging suggestive of acute cholelithiasis. Given that the patient is taking plavix, would recommend IR consultation for cholecystostomy tube placement in the acute setting. Can see the patient in the outpatient setting and plan for elective cholecystectomy at a later date, when his plavix can be held.     -- doing well  -- will plan to see in clinic as an outpatient to discuss interval, cholecystectomy        Masha Rodriguez MD  General Surgery  [facility] Surg

## 2024-03-01 NOTE — PLAN OF CARE
Pt agreeable with discharge to home. Appointments added for AVS. Pt will be transported by his spouse Abi Coyne.  Future Appointments   Date Time Provider Department Center   3/11/2024  9:30 AM Irish Buchanan MD [facility]      03/01/24 1551   Final Note   Assessment Type Final Discharge Note   Anticipated Discharge Disposition Home   What phone number can be called within the next 1-3 days to see how you are doing after discharge? [phone]   Hospital Resources/Appts/Education Provided Appointments scheduled and added to AVS   Post-Acute Status   Discharge Delays None known at this time

## 2024-03-01 NOTE — DISCHARGE SUMMARY
[facility]  Discharge Summary      Patient Name: Yoan Coyne  MRN: [id number]  MISTY: [id number]  Patient Class: IP- Inpatient  Admission Date: 2/29/2024  Hospital Length of Stay: 0 days  Discharge Date and Time:  03/01/2024 2:57 PM  Attending Physician: Terry Fuentes MD   Discharging Provider: Terry Fuentes MD  Primary Care Provider: Charleen, Primary Doctor    Primary Care Team: Networked reference to record PCT     HPI:   This is a 73-year-old  male who comes in with abdominal pain that started about a week ago initially.  It became better and patient was doing fine until this morning when he woke up and had sudden onset abdominal pain with some nausea vomiting.  Therefore we decided to come to the ER for further evaluation and treatment.  CT imaging suspicious for calculus cholecystitis.  General surgery was consulted.  General surgery recommended IR consult for percutaneous intervention and no surgical intervention.  IR consulted and patient is now status post right upper quadrant cholecystostomy tube placement .  Patient's wife by bedside also helps provide history.  Patient is awake alert and oriented x3 and in no acute distress.  Denies chest pain shortness on breath fever chills, dysuria hematuria, blood in stools black stools, loss of consciousness or seizures.  Patient admitted to hospitalist services    * No surgery found *      Hospital Course:   No notes on file   Physical Exam  Vitals and nursing note reviewed.   Constitutional:       General: He is not in acute distress.     Appearance: Normal appearance.   HENT:      Head: Normocephalic and atraumatic.      Nose: Nose normal.      Mouth/Throat:      Mouth: Mucous membranes are moist.   Eyes:      Extraocular Movements: Extraocular movements intact.      Pupils: Pupils are equal, round, and reactive to light.   Cardiovascular:      Rate and Rhythm: Normal rate and regular rhythm.      Pulses: Normal pulses.       Heart sounds: Normal heart sounds.   Pulmonary:      Effort: Pulmonary effort is normal. No respiratory distress.      Breath sounds: Normal breath sounds.   Abdominal:      General: Bowel sounds are normal.      Palpations: Abdomen is soft.      Comments: Right upper quadrant drain in place.  No bleeding discharge or erythema seen around surgical site.  Tenderness to right upper quadrant   Musculoskeletal:         General: No deformity. Normal range of motion.      Cervical back: Normal range of motion and neck supple.      Right lower leg: No edema.      Left lower leg: No edema.   Skin:     General: Skin is warm and dry.   Neurological:      General: No focal deficit present.      Mental Status: He is alert and oriented to person, place, and time. Mental status is at baseline.      Cranial Nerves: No cranial nerve deficit.   Psychiatric:         Mood and Affect: Mood normal.      Goals of Care Treatment Preferences:         Consults:   Consults (From admission, onward)          Status Ordering Provider     Inpatient consult to General surgery  Once        Provider:  Cordell Kraus MD    Completed MIGUEL RAMIREZ     Inpatient consult to Interventional Radiology  Once        Provider:  (Not yet assigned)    BLAYNE Doran            No new Assessment & Plan notes have been filed under this hospital service since the last note was generated.  Service: Hospital Medicine    Final Active Diagnoses:    Diagnosis Date Noted POA    PRINCIPAL PROBLEM:  Acute cholecystitis due to biliary calculus [K80.00] 02/29/2024 Yes    Hypomagnesemia [E83.42] 03/01/2024 No    Coronary artery disease without angina pectoris [I25.10] 02/29/2024 Yes    Primary hypertension [I10] 02/29/2024 Yes    Type 2 diabetes mellitus without complication, without long-term current use of insulin [E11.9] 02/29/2024 Yes      Problems Resolved During this Admission:    Diagnosis Date Noted Date Resolved POA    Cholecystitis  [K81.9] 02/29/2024 03/01/2024 Yes       Discharged Condition: stable    Disposition: Home or Self Care    Follow Up:   Follow-up Information       Cordell Kraus MD Follow up in 2 week(s).    Specialties: Surgery, General Surgery  Why: Office will contact you with a follow up appointment, if you don't hear from them by Monday 3/4 call office to make appointment.  Contact information:  [address]  [address]  [place] [address]  [phone]               Irish Buchanan MD Follow up on 3/11/2024.    Specialty: Internal Medicine  Why: Appointment scheduled for 9:30 AM  Contact information:  [address]  [address]  [place] [address]  [phone]                           Patient Instructions:      Diet Cardiac     Diet diabetic     Activity as tolerated           Pending Diagnostic Studies:       None           Medications:  Reconciled Home Medications:      Medication List        START taking these medications      ciprofloxacin HCl 500 MG tablet  Commonly known as: CIPRO  Take 1 tablet (500 mg total) by mouth 2 (two) times daily. for 10 days     HYDROcodone-acetaminophen 5-325 mg per tablet  Commonly known as: NORCO  Take 1 tablet by mouth every 8 (eight) hours as needed for Pain.     magnesium oxide 400 mg (241.3 mg magnesium) tablet  Commonly known as: MAG-OX  Take 1 tablet (400 mg total) by mouth 2 (two) times daily. for 1 day  Start taking on: March 2, 2024     metroNIDAZOLE 500 MG tablet  Commonly known as: FLAGYL  Take 1 tablet (500 mg total) by mouth 3 (three) times daily. for 10 days            CONTINUE taking these medications      aspirin 81 MG EC tablet  Commonly known as: ECOTRIN  Take 81 mg by mouth once daily.     atorvastatin 40 MG tablet  Commonly known as: LIPITOR  Take 40 mg by mouth once daily.     clopidogreL 75 mg tablet  Commonly known as: PLAVIX  Take 75 mg by mouth once daily.     glyBURIDE 5 MG tablet  Commonly known as: DIABETA  Take 5 mg by mouth 2 (two) times  daily.     ibuprofen 200 MG tablet  Commonly known as: ADVIL,MOTRIN  Take 200 mg by mouth every 6 (six) hours as needed for Pain.     * metFORMIN 1000 MG tablet  Commonly known as: GLUCOPHAGE  Take 1,000 mg by mouth every morning. Take with 1/2 tablet of 500 mg(1250 mg)     * metFORMIN 500 MG tablet  Commonly known as: GLUCOPHAGE  Take 250 mg by mouth 2 (two) times a day.     metoprolol tartrate 100 MG tablet  Commonly known as: LOPRESSOR  Take 50 mg by mouth 2 (two) times daily.     ramipriL 2.5 MG capsule  Commonly known as: ALTACE  Take 2.5 mg by mouth every evening.           * This list has 2 medication(s) that are the same as other medications prescribed for you. Read the directions carefully, and ask your doctor or other care provider to review them with you.                  Indwelling Lines/Drains at time of discharge:   Lines/Drains/Airways       Drain  Duration                  Biliary Tube 02/29/24 1632 Other (Comment) 8 Fr. RUQ <1 day                    Time spent on the discharge of patient: 35 minutes         Terry Fuentes MD  Department of Hospital Medicine  [facility]

## 2024-03-01 NOTE — NURSING
Patient alert and awake. For discharge today with Right mid quadrant IR drain. Patient and family member taught on biliary drain care (draining bag and flushing 10ml of NS BID). Drain log given and taught how to use it and to bring in on follow up appt.  AVS printed and handed out to patient. PIV removed. Home med delivered at bedside. Vn to review AVS and request transport.

## 2024-03-01 NOTE — PLAN OF CARE
"CM met with pt at bedside to discuss discharge planning  Dx: Cholecystitis  Pt is independent with ADL's. Pt has no DME or HH services. Pt lives at home with spouse. He will be discharged with R upper quad Pauly tube. Pt refused any HH services, stated "My wife and I will be ok, she's had a tube in the past". Upon discharge, pts family member Abi Coyne (spouse) [phone]  will provide transport home. Pt made aware to contact CM with any questions or concerns. Cm will continue to follow and assist with any discharge needs. Spoke with Saskia with scheduling, unable to make f/u, message was sent to Kenisha Kraus's office requesting to contact pt with s/p Sx follow up appt.  Future Appointments   Date Time Provider Department Center   3/11/2024  9:30 AM Irish Buchanan MD [facility] [place] [facility]   [place] [facility]  Initial Discharge Assessment       Primary Care Provider: No, Primary Doctor    Admission Diagnosis: Cholecystitis [K81.9]  Epigastric pain [R10.13]  Upper abdominal pain [R10.10]    Admission Date: 2/29/2024  Expected Discharge Date: 3/1/2024    Transition of Care Barriers: (P) None    Payor: MEDICARE / Plan: MEDICARE PART A & B / Product Type: Government /     Extended Emergency Contact Information  Primary Emergency Contact: Aib Coyne  Mobile Phone: [phone]  Relation: Spouse  Preferred language: English   needed? No    Discharge Plan A: (P) Home, Home with family         [facility] - [facility] [address]  [address]  [place] [address]  Phone: [phone] Fax: [phone]      Initial Assessment (most recent)       Adult Discharge Assessment - 03/01/24 1539          Discharge Assessment    Assessment Type Discharge Planning Assessment     Confirmed/corrected address, phone number and insurance Yes     Confirmed Demographics Correct on Facesheet     Source of Information patient     Communicated THAO with patient/caregiver Yes (P)      Reason " For Admission placement of Percutaneous gallbladder Tube (P)      People in Home spouse (P)      Do you expect to return to your current living situation? Yes (P)      Do you have help at home or someone to help you manage your care at home? Yes (P)      Who are your caregiver(s) and their phone number(s)? Abi Coyne (spouse) [phone] (P)      Prior to hospitilization cognitive status: Alert/Oriented (P)      Current cognitive status: Alert/Oriented (P)      Walking or Climbing Stairs Difficulty no (P)      Dressing/Bathing Difficulty no (P)      Equipment Currently Used at Home none (P)      Readmission within 30 days? No (P)      Patient currently being followed by outpatient case management? No (P)      Do you currently have service(s) that help you manage your care at home? No (P)      Do you take prescription medications? Yes (P)      Do you have prescription coverage? Yes (P)      Coverage Medicare/[employer] / TLL (P)      Do you have any problems affording any of your prescribed medications? No (P)      Is the patient taking medications as prescribed? yes (P)      Who is going to help you get home at discharge? Abi Coyne (spouse) [phone] (P)      How do you get to doctors appointments? car, drives self (P)      Are you on dialysis? No (P)      Do you take coumadin? No (P)      Discharge Plan A Home;Home with family (P)      DME Needed Upon Discharge  none (P)      Discharge Plan discussed with: Patient (P)      Transition of Care Barriers None (P)         Physical Activity    On average, how many days per week do you engage in moderate to strenuous exercise (like a brisk walk)? 2 days (P)      On average, how many minutes do you engage in exercise at this level? 60 min (P)         Financial Resource Strain    How hard is it for you to pay for the very basics like food, housing, medical care, and heating? Not hard at all (P)         Housing Stability    In the last 12 months, was there a time when you  were not able to pay the mortgage or rent on time? No (P)      In the last 12 months, how many places have you lived? 1 (P)      In the last 12 months, was there a time when you did not have a steady place to sleep or slept in a shelter (including now)? No (P)         Transportation Needs    In the past 12 months, has lack of transportation kept you from medical appointments or from getting medications? No (P)      In the past 12 months, has lack of transportation kept you from meetings, work, or from getting things needed for daily living? No (P)         Food Insecurity    Within the past 12 months, you worried that your food would run out before you got the money to buy more. Never true (P)      Within the past 12 months, the food you bought just didn't last and you didn't have money to get more. Never true (P)         Stress    Do you feel stress - tense, restless, nervous, or anxious, or unable to sleep at night because your mind is troubled all the time - these days? Not at all (P)         Social Connections    In a typical week, how many times do you talk on the phone with family, friends, or neighbors? More than three times a week (P)      How often do you get together with friends or relatives? More than three times a week (P)      Do you belong to any clubs or organizations such as Congregational groups, unions, fraternal or athletic groups, or school groups? No (P)      Are you , , , , never , or living with a partner?  (P)         Alcohol Use    Q1: How often do you have a drink containing alcohol? Monthly or less (P)      Q2: How many drinks containing alcohol do you have on a typical day when you are drinking? 1 or 2 (P)      Q3: How often do you have six or more drinks on one occasion? Never (P)         OTHER    Name(s) of People in Home Abi Coyne (spouse) [phone] (P)

## 2024-03-01 NOTE — SUBJECTIVE & OBJECTIVE
Interval History:   IR cholecystostomy tube placed yesterday. Pain well controlled. T bili and LFTs remain normal.     Medications:  Continuous Infusions:   sodium chloride 0.9% 100 mL/hr at 03/01/24 0612     Scheduled Meds:   piperacillin-tazobactam (Zosyn) IV (PEDS and ADULTS) (extended infusion is not appropriate)  4.5 g Intravenous Q8H     PRN Meds:dextrose 10%, dextrose 10%, glucagon (human recombinant), hydrALAZINE, ondansetron     Review of patient's allergies indicates:  No Known Allergies  Objective:     Vital Signs (Most Recent):  Temp: 97.9 °F (36.6 °C) (03/01/24 0345)  Pulse: 71 (03/01/24 0534)  Resp: 18 (03/01/24 0345)  BP: 100/60 (03/01/24 0345)  SpO2: 97 % (03/01/24 0345) Vital Signs (24h Range):  Temp:  [97.5 °F (36.4 °C)-99.7 °F (37.6 °C)] 97.9 °F (36.6 °C)  Pulse:  [61-91] 71  Resp:  [16-20] 18  SpO2:  [96 %-100 %] 97 %  BP: ()/(55-86) 100/60     Weight: 77.6 kg (171 lb 1.2 oz)  Body mass index is 26.79 kg/m².    Intake/Output - Last 3 Shifts         02/28 0700  02/29 0659 02/29 0700  03/01 0659 03/01 0700  03/02 0659    P.O.  120     I.V. (mL/kg)  612 (7.9)     IV Piggyback  99.9     Total Intake(mL/kg)  831.9 (10.7)     Urine (mL/kg/hr)  425     Drains  80 60    Total Output  505 60    Net  +326.9 -60           Urine Occurrence  400 x              Physical Exam  Vitals reviewed.   Constitutional:       General: He is not in acute distress.     Appearance: Normal appearance. He is not toxic-appearing.   HENT:      Head: Normocephalic and atraumatic.      Nose: Nose normal.      Mouth/Throat:      Mouth: Mucous membranes are moist.   Cardiovascular:      Rate and Rhythm: Normal rate.      Pulses: Normal pulses.   Pulmonary:      Effort: Pulmonary effort is normal. No respiratory distress.   Abdominal:      General: Abdomen is flat. There is no distension.      Palpations: Abdomen is soft.      Tenderness: There is abdominal tenderness. There is no guarding or rebound.      Comments: Former  skin graft donor site to abdomen, well healed   Abdominal tenderness improved now mostly at drain site  Biliary drain to RUQ, bilious output in drainage bag    Skin:     General: Skin is warm.   Neurological:      General: No focal deficit present.      Mental Status: He is alert and oriented to person, place, and time.   Psychiatric:         Mood and Affect: Mood normal.         Behavior: Behavior normal.          Significant Labs:  I have reviewed all pertinent lab results within the past 24 hours.  CBC:   Recent Labs   Lab 03/01/24  0509   WBC 9.77   RBC 4.11*   HGB 12.4*   HCT 36.1*      MCV 88   MCH 30.2   MCHC 34.3     CMP:   Recent Labs   Lab 03/01/24  0509   *   CALCIUM 8.9   ALBUMIN 2.8*   PROT 5.9*      K 3.9   CO2 23      BUN 12   CREATININE 1.1   ALKPHOS 71   ALT 26   AST 30   BILITOT 1.0       Significant Diagnostics:  I have reviewed all pertinent imaging results/findings within the past 24 hours.

## 2024-03-05 ENCOUNTER — TELEPHONE (OUTPATIENT)
Dept: SURGERY | Facility: CLINIC | Age: 74
End: 2024-03-05
Payer: MEDICARE

## 2024-03-05 LAB
BACTERIA BLD CULT: NORMAL
BACTERIA BLD CULT: NORMAL

## 2024-03-05 NOTE — TELEPHONE ENCOUNTER
----- Message from Rekha Crawford sent at 3/5/2024  2:01 PM CST -----  Type:  Needs Medical Advice    Who Called:  Pt  Abi    Would the patient rather a call back or a response via [facility]?  call  Best Call Back Number:   [phone]  Additional Information:  Wife would like a call back from  regarding an upcoming appt that pt has scheduled on 3/15/24.   Wife has questions.             03/05/2024                       1424  Attempted to contact patient regarding the above message. No answer. Left voicemail.

## 2024-03-06 ENCOUNTER — OFFICE VISIT (OUTPATIENT)
Dept: PRIMARY CARE CLINIC | Facility: CLINIC | Age: 74
End: 2024-03-06
Payer: MEDICARE

## 2024-03-06 VITALS
HEART RATE: 67 BPM | HEIGHT: 67 IN | DIASTOLIC BLOOD PRESSURE: 63 MMHG | TEMPERATURE: 98 F | BODY MASS INDEX: 26.64 KG/M2 | SYSTOLIC BLOOD PRESSURE: 107 MMHG | WEIGHT: 169.75 LBS | OXYGEN SATURATION: 99 %

## 2024-03-06 DIAGNOSIS — I25.10 CORONARY ARTERY DISEASE INVOLVING NATIVE CORONARY ARTERY OF NATIVE HEART WITHOUT ANGINA PECTORIS: ICD-10-CM

## 2024-03-06 DIAGNOSIS — K80.00 ACUTE CHOLECYSTITIS DUE TO BILIARY CALCULUS: Primary | ICD-10-CM

## 2024-03-06 DIAGNOSIS — E11.9 TYPE 2 DIABETES MELLITUS WITHOUT COMPLICATION, WITHOUT LONG-TERM CURRENT USE OF INSULIN: ICD-10-CM

## 2024-03-06 PROBLEM — E83.42 HYPOMAGNESEMIA: Status: RESOLVED | Noted: 2024-03-01 | Resolved: 2024-03-06

## 2024-03-06 PROCEDURE — 99214 OFFICE O/P EST MOD 30 MIN: CPT | Mod: PBBFAC,PO | Performed by: INTERNAL MEDICINE

## 2024-03-06 PROCEDURE — 99204 OFFICE O/P NEW MOD 45 MIN: CPT | Mod: S$PBB,,, | Performed by: INTERNAL MEDICINE

## 2024-03-06 PROCEDURE — 99999 PR PBB SHADOW E&M-EST. PATIENT-LVL IV: CPT | Mod: PBBFAC,,, | Performed by: INTERNAL MEDICINE

## 2024-03-06 NOTE — PROGRESS NOTES
[facility]   New Visit Progress Note   Recent Hospital Discharge     PRESENTING HISTORY     Chief Complaint/Reason for Admission:  Follow up Hospital Discharge   PCP: Charleen, Primary Doctor    History of Present Illness:  Mr. Yoan Coyne is a 73 y.o. male who was recently admitted to the hospital.    [facility]  Discharge Summary        Patient Name: Yoan Coyne  MRN: [id number]  MISTY: [id number]  Patient Class: IP- Inpatient  Admission Date: 2/29/2024  Hospital Length of Stay: 0 days  Discharge Date and Time:  03/01/2024 2:57 PM  Attending Physician: Terry Fuentes MD   Discharging Provider: Terry Fuentes MD  Primary Care Provider: Charleen, Primary Doctor  ___________________________________________________________________    Today:  [facility] to [facility] for initial hospital follow up.  Recently hospitalized for management of acute cholecystitis.   Admitted to [facility] Medicine service with General Surgery consultation.  Patient on Plavix/Aspirin as outpatient (cardiac stent 2011), thus acute surgical intervention deferred.   IR was consulted for placement of cholecystostomy tube.   Patient discharged to home with plan for outpatient General Surgery follow up.  Patient is to flush drain with 10 cc NS BID.  Plan for routine exchange 10-12 weeks unless cholecystectomy performed.   Cipro and Flagyl prescribed at discharge; Plavix resumed.     Patient accompanied today by family.  Ambulatory and independent with ADL's.  Reports compliance with all medication and brought bottles for review.  No complications with cholecystostomy tube and wife has been flushing BID- she has all necessary supplies.    Review of Systems  General ROS: negative for chills, fever or weight loss  + easily fatigued   Psychological ROS: negative for hallucination, depression or suicidal ideation  Ophthalmic ROS: negative for blurry vision, photophobia or eye pain  ENT ROS: negative for epistaxis,  sore throat or rhinorrhea  Respiratory ROS: no cough, shortness of breath, or wheezing  Cardiovascular ROS: no chest pain or dyspnea on exertion  Gastrointestinal ROS: no abdominal pain, change in bowel habits, or black/ bloody stools  Genito-Urinary ROS: no dysuria, trouble voiding, or hematuria  Musculoskeletal ROS: negative for gait disturbance or muscular weakness  Neurological ROS: no syncope or seizures; no ataxia  Dermatological ROS: negative for pruritis, rash and jaundice      PAST HISTORY:     Past Medical History:   Diagnosis Date    Coronary artery disease     Diabetes mellitus     Hypertension        No past surgical history on file.    No family history on file.      MEDICATIONS & ALLERGIES:     Current Outpatient Medications on File Prior to Visit   Medication Sig Dispense Refill    aspirin (ECOTRIN) 81 MG EC tablet Take 81 mg by mouth once daily.      atorvastatin (LIPITOR) 40 MG tablet Take 40 mg by mouth once daily.      ciprofloxacin HCl (CIPRO) 500 MG tablet Take 1 tablet (500 mg total) by mouth 2 (two) times daily. for 10 days 20 tablet 0    clopidogreL (PLAVIX) 75 mg tablet Take 75 mg by mouth once daily.      glyBURIDE (DIABETA) 5 MG tablet Take 5 mg by mouth 2 (two) times daily.      HYDROcodone-acetaminophen (NORCO) 5-325 mg per tablet Take 1 tablet by mouth every 8 (eight) hours as needed for Pain. 21 tablet 0    ibuprofen (ADVIL,MOTRIN) 200 MG tablet Take 200 mg by mouth every 6 (six) hours as needed for Pain.      metFORMIN (GLUCOPHAGE) 1000 MG tablet Take 1,000 mg by mouth every morning. Take with 1/2 tablet of 500 mg(1250 mg)      metFORMIN (GLUCOPHAGE) 500 MG tablet Take 250 mg by mouth 2 (two) times a day.      metoprolol tartrate (LOPRESSOR) 100 MG tablet Take 50 mg by mouth 2 (two) times daily.      metroNIDAZOLE (FLAGYL) 500 MG tablet Take 1 tablet (500 mg total) by mouth 3 (three) times daily. for 10 days 30 tablet 0    ramipriL (ALTACE) 2.5 MG capsule Take 2.5 mg by mouth every  "evening.       No current facility-administered medications on file prior to visit.        Review of patient's allergies indicates:  No Known Allergies    OBJECTIVE:     Vital Signs:  /63 (BP Location: Left arm, Patient Position: Sitting, BP Method: Small (Automatic))   Pulse 67   Temp 97.6 °F (36.4 °C)   Ht 5' 7.01" (1.702 m)   Wt 77 kg (169 lb 12.1 oz)   SpO2 99%   BMI 26.58 kg/m²   Wt Readings from Last 3 Encounters:   03/06/24 1019 77 kg (169 lb 12.1 oz)   03/01/24 0531 77.6 kg (171 lb 1.2 oz)   02/29/24 1552 76.2 kg (168 lb)   02/29/24 0828 76.2 kg (168 lb)   09/18/21 1333 77.1 kg (170 lb)     Body mass index is 26.58 kg/m².        Physical Exam:  /63 (BP Location: Left arm, Patient Position: Sitting, BP Method: Small (Automatic))   Pulse 67   Temp 97.6 °F (36.4 °C)   Ht 5' 7.01" (1.702 m)   Wt 77 kg (169 lb 12.1 oz)   SpO2 99%   BMI 26.58 kg/m²   General appearance: alert, cooperative, no distress  Constitutional:Oriented to person, place, and time  + appears well-developed and well-nourished.   HEENT: Normocephalic, atraumatic, neck symmetrical, no nasal discharge   Eyes: conjunctivae/corneas clear, PERRL, EOM's intact  Lungs: clear to auscultation bilaterally, no dullness to percussion bilaterally  Heart: regular rate and rhythm without rub; no displacement of the PMI   Abdomen: soft, non-tender; bowel sounds normoactive; no organomegaly  + cholecystostomy tube  Extremities: extremities symmetric; no clubbing, cyanosis, or edema  Integument: Skin color, texture, turgor normal; no rashes; hair distrubution normal  Neurologic: Alert and oriented X 3, normal strength, normal coordination and gait  Psychiatric: no pressured speech; normal affect; no evidence of impaired cognition     Laboratory  Lab Results   Component Value Date    WBC 9.77 03/01/2024    HGB 12.4 (L) 03/01/2024    HCT 36.1 (L) 03/01/2024    MCV 88 03/01/2024     03/01/2024     BMP  Lab Results   Component Value " Date     03/01/2024    K 3.9 03/01/2024     03/01/2024    CO2 23 03/01/2024    BUN 12 03/01/2024    CREATININE 1.1 03/01/2024    CALCIUM 8.9 03/01/2024    ANIONGAP 9 03/01/2024    EGFRNORACEVR >60 03/01/2024     Lab Results   Component Value Date    ALT 26 03/01/2024    AST 30 03/01/2024    ALKPHOS 71 03/01/2024    BILITOT 1.0 03/01/2024     Lab Results   Component Value Date    INR 1.1 02/29/2024     Lab Results   Component Value Date    HGBA1C 7.2 (H) 02/29/2024       Diagnostic Results:    CT ABD Pelvis 2/29/24:  Findings compatible with acute, calculus cholecystitis.     US ABD 2/29/24:  Findings compatible with acute, calculus cholecystitis.     ASSESSMENT & PLAN:       Acute cholecystitis due to biliary calculus  - recent hospitalization as above  - surgery deferred to outpatient setting due to patient being on Plavix  - see General Surgery team 3/15/24     Type 2 diabetes mellitus without complication, without long-term current use of insulin  - defer  management to primary care team     Coronary artery disease involving native coronary artery of native heart without angina pectoris  - cardiac stent placed in 2011 at [facility]- details unclear and records not immediately available for review  - lost to cardiology follow up > 10 years ago  - would benefit from cardiology evaluation prior to surgery  - non smoker, cuts grass with push lawnmower but does not perform any additional exercise-> reports easy fatigability but denies chest pain or dyspnea   - see Cardiology team 3/15/24    -     Ambulatory referral/consult to Cardiology; Future; Expected date: 03/13/2024    Patient will be released from hospital follow up clinic.  He will see his PCP, Dr Merline Puri, as directed.  Records will be shared for coordination of care.     Instructions for the patient:      Scheduled Follow-up :  Future Appointments   Date Time Provider Department Center   3/15/2024  9:00 AM Benton Mason MD [facility] CARDIO [place]    3/15/2024  2:20 PM Wily Ugarte MD [facility] EZE [facility]       Post Visit Medication List:     Medication List            Accurate as of March 6, 2024 12:52 PM. If you have any questions, ask your nurse or doctor.                CONTINUE taking these medications      aspirin 81 MG EC tablet  Commonly known as: ECOTRIN     atorvastatin 40 MG tablet  Commonly known as: LIPITOR     ciprofloxacin HCl 500 MG tablet  Commonly known as: CIPRO  Take 1 tablet (500 mg total) by mouth 2 (two) times daily. for 10 days     clopidogreL 75 mg tablet  Commonly known as: PLAVIX     glyBURIDE 5 MG tablet  Commonly known as: DIABETA     HYDROcodone-acetaminophen 5-325 mg per tablet  Commonly known as: NORCO  Take 1 tablet by mouth every 8 (eight) hours as needed for Pain.     ibuprofen 200 MG tablet  Commonly known as: ADVIL,MOTRIN     * metFORMIN 1000 MG tablet  Commonly known as: GLUCOPHAGE     * metFORMIN 500 MG tablet  Commonly known as: GLUCOPHAGE     metoprolol tartrate 100 MG tablet  Commonly known as: LOPRESSOR     metroNIDAZOLE 500 MG tablet  Commonly known as: FLAGYL  Take 1 tablet (500 mg total) by mouth 3 (three) times daily. for 10 days     ramipriL 2.5 MG capsule  Commonly known as: ALTACE           * This list has 2 medication(s) that are the same as other medications prescribed for you. Read the directions carefully, and ask your doctor or other care provider to review them with you.                  Signing Physician:  Irish Buchanan MD

## 2024-03-15 ENCOUNTER — OFFICE VISIT (OUTPATIENT)
Dept: CARDIOLOGY | Facility: CLINIC | Age: 74
End: 2024-03-15
Payer: MEDICARE

## 2024-03-15 ENCOUNTER — OFFICE VISIT (OUTPATIENT)
Dept: SURGERY | Facility: CLINIC | Age: 74
End: 2024-03-15
Payer: MEDICARE

## 2024-03-15 VITALS
WEIGHT: 163 LBS | SYSTOLIC BLOOD PRESSURE: 97 MMHG | HEART RATE: 88 BPM | HEIGHT: 67 IN | DIASTOLIC BLOOD PRESSURE: 62 MMHG | BODY MASS INDEX: 25.58 KG/M2

## 2024-03-15 VITALS
SYSTOLIC BLOOD PRESSURE: 96 MMHG | BODY MASS INDEX: 25.88 KG/M2 | TEMPERATURE: 98 F | DIASTOLIC BLOOD PRESSURE: 63 MMHG | HEIGHT: 67 IN | HEART RATE: 81 BPM | WEIGHT: 164.88 LBS

## 2024-03-15 DIAGNOSIS — I25.10 CORONARY ARTERY DISEASE INVOLVING NATIVE CORONARY ARTERY OF NATIVE HEART WITHOUT ANGINA PECTORIS: Primary | ICD-10-CM

## 2024-03-15 DIAGNOSIS — I10 PRIMARY HYPERTENSION: ICD-10-CM

## 2024-03-15 DIAGNOSIS — K80.00 ACUTE CHOLECYSTITIS DUE TO BILIARY CALCULUS: Primary | ICD-10-CM

## 2024-03-15 DIAGNOSIS — E11.9 TYPE 2 DIABETES MELLITUS WITHOUT COMPLICATION, WITHOUT LONG-TERM CURRENT USE OF INSULIN: ICD-10-CM

## 2024-03-15 DIAGNOSIS — E78.49 OTHER HYPERLIPIDEMIA: ICD-10-CM

## 2024-03-15 PROCEDURE — 99204 OFFICE O/P NEW MOD 45 MIN: CPT | Mod: S$PBB,,, | Performed by: INTERNAL MEDICINE

## 2024-03-15 PROCEDURE — 99999 PR PBB SHADOW E&M-EST. PATIENT-LVL III: CPT | Mod: PBBFAC,,, | Performed by: INTERNAL MEDICINE

## 2024-03-15 PROCEDURE — 99213 OFFICE O/P EST LOW 20 MIN: CPT | Mod: PBBFAC | Performed by: INTERNAL MEDICINE

## 2024-03-15 PROCEDURE — 99999 PR PBB SHADOW E&M-EST. PATIENT-LVL III: CPT | Mod: PBBFAC,,, | Performed by: SURGERY

## 2024-03-15 PROCEDURE — 99214 OFFICE O/P EST MOD 30 MIN: CPT | Mod: S$PBB,,, | Performed by: SURGERY

## 2024-03-15 PROCEDURE — 99213 OFFICE O/P EST LOW 20 MIN: CPT | Mod: PBBFAC,27,PN | Performed by: SURGERY

## 2024-03-15 NOTE — PROGRESS NOTES
[facility]  OUTPATIENT Followup        HPI: Yoan Coyne is a 73 y.o. male ,sp IR is[place] tube.  On plavix with CAD.  Saw cardiologist today, ECHO pending.  Feels better, no abdominal pain.  Tube workng well and they are flushing it.     I have reviewed the patient's chart including prior progress notes, procedures and testing.     ROS:   Review of Systems   All other systems reviewed and are negative.      PROBLEM LIST:  Patient Active Problem List   Diagnosis    Acute cholecystitis due to biliary calculus    Primary hypertension    Type 2 diabetes mellitus without complication, without long-term current use of insulin    Coronary artery disease without angina pectoris    Other hyperlipidemia         HISTORY  Past Medical History:   Diagnosis Date    Coronary artery disease     Diabetes mellitus     Hypertension        No past surgical history on file.    Social History     Tobacco Use    Smoking status: Never     Passive exposure: Never    Smokeless tobacco: Never       No family history on file.      MEDS:  Current Outpatient Medications on File Prior to Visit   Medication Sig Dispense Refill    aspirin (ECOTRIN) 81 MG EC tablet Take 81 mg by mouth once daily.      atorvastatin (LIPITOR) 40 MG tablet Take 40 mg by mouth once daily.      clopidogreL (PLAVIX) 75 mg tablet Take 75 mg by mouth once daily.      glyBURIDE (DIABETA) 5 MG tablet Take 5 mg by mouth 2 (two) times daily.      ibuprofen (ADVIL,MOTRIN) 200 MG tablet Take 200 mg by mouth every 6 (six) hours as needed for Pain.      metFORMIN (GLUCOPHAGE) 1000 MG tablet Take 1,000 mg by mouth every morning. Take with 1/2 tablet of 500 mg(1250 mg)      metFORMIN (GLUCOPHAGE) 500 MG tablet Take 250 mg by mouth 2 (two) times a day.      metoprolol tartrate (LOPRESSOR) 100 MG tablet Take 50 mg by mouth 2 (two) times daily.      ramipriL (ALTACE) 2.5 MG capsule Take 2.5 mg by mouth every evening.       No current facility-administered medications on  file prior to visit.       ALLERGIES:  Review of patient's allergies indicates:  No Known Allergies      VITALS:  Vitals:    03/15/24 1407   BP: 96/63   Pulse: 81   Temp: 97.7 °F (36.5 °C)         PHYSICAL EXAM:  Physical Exam  Constitutional:       Appearance: Normal appearance.   HENT:      Head: Normocephalic and atraumatic.   Pulmonary:      Effort: Pulmonary effort is normal.   Abdominal:      Palpations: Abdomen is soft.      Comments: Tube in place with bilious output    Neurological:      Mental Status: He is oriented to person, place, and time. Mental status is at baseline.           LABS:  Lab Results   Component Value Date    WBC 9.77 03/01/2024    RBC 4.11 (L) 03/01/2024    HGB 12.4 (L) 03/01/2024    HCT 36.1 (L) 03/01/2024     03/01/2024     Lab Results   Component Value Date     (H) 03/01/2024     03/01/2024    K 3.9 03/01/2024     03/01/2024    CO2 23 03/01/2024    BUN 12 03/01/2024    CREATININE 1.1 03/01/2024    CALCIUM 8.9 03/01/2024     Lab Results   Component Value Date    ALT 26 03/01/2024    AST 30 03/01/2024    ALKPHOS 71 03/01/2024    BILITOT 1.0 03/01/2024     Lab Results   Component Value Date    MG 1.4 (L) 03/01/2024    PHOS 2.9 03/01/2024       STUDIES:  IR, CT, U/S images and reports were personally reviewed.        ASSESSMENT & PLAN:  73 y.o. male will plan for cholecystectomy in 6 weeks if ECHO acceptable.      Wily Ugarte M.D., F.A.C.S.  [id number] and General Surgery  [facility]

## 2024-03-15 NOTE — PROGRESS NOTES
HISTORY:    73-year-old male w a h/o CAD w ACS event in '10 s/p PCIx3 '10, hypertension, hyperlipidemia, dm presenting for initial evaluation.    Recent admission for abdominal pain s/p cholecystomy tube placement for cholecystitis. Symptoms improved post tube placement.     Referred for cardiac evaluation prior to anticipated surgery. No issues since '10 event.    The patient denies any symptoms of chest pain, shortness of breath, or dyspnea on exertion.    Activity levels mild by choice. Retired [employer] for over a decade.    The patient denies any previous history of stroke, congestive heart failure, or cardiomyopathy.    Tolerates aspirin 81 x 1, clopidogrel 75 x 1, atorvastatin 40 x 1, metoprolol 100 x 2, ramipril 2.5 x 1, metformin 500 x 2, glyburide 5 x 2.    PHYSICAL EXAM:    Vitals:    03/15/24 0859   BP: 97/62   Pulse: 88       NAD, A+Ox3.  No jvd, no bruit.  RRR nml s1,s2. No murmurs.  CTA B no wheezes or crackles.  No edema.    LABS/STUDIES (imaging reviewed during clinic visit):    March 2024 hemoglobin 12.4 with MCV of 88.  Creatinine 1.1 with a BUN of 12.    A1c 7.2.  Troponin negative.  EKG February 2024 sinus rhythm with no Q-waves or ST changes.    Chest x-ray February 2024 clear with normal cardiac silhouette.    ASSESSMENT & PLAN:    1. Coronary artery disease involving native coronary artery of native heart without angina pectoris    2. Primary hypertension    3. Type 2 diabetes mellitus without complication, without long-term current use of insulin    4. Other hyperlipidemia        Orders Placed This Encounter    Echo        SIHD s/p PCI to unknown vessels in '10. Tolerates asa 81x1 and clopidogrel 75x1. Nml ecg and no cv symptoms since '10 event.     On atorvastatin 40 x 1.    Bps controlled on metoprolol 100 x 2, ramipril 2.5 x 1.    1 RF by RCRI criteria. Pt optimized without CV symptoms. Check TTE to reaffirm suspicion fo nml cardiac function. Otherwise no indication for  further CV testing prior to anticipated lap israel. Okay to hold clopidogrel 5 days pre-procedure. If necessary can hold asa 7 days pre-procedure.     Follow up in about 6 months (around 9/15/2024).      Benton Mason MD

## 2024-03-15 NOTE — H&P (VIEW-ONLY)
[facility]  OUTPATIENT Followup        HPI: Yoan Coyne is a 73 y.o. male ,sp IR israel tube.  On plavix with CAD.  Saw cardiologist today, ECHO pending.  Feels better, no abdominal pain.  Tube workng well and they are flushing it.     I have reviewed the patient's chart including prior progress notes, procedures and testing.     ROS:   Review of Systems   All other systems reviewed and are negative.      PROBLEM LIST:  Patient Active Problem List   Diagnosis    Acute cholecystitis due to biliary calculus    Primary hypertension    Type 2 diabetes mellitus without complication, without long-term current use of insulin    Coronary artery disease without angina pectoris    Other hyperlipidemia         HISTORY  Past Medical History:   Diagnosis Date    Coronary artery disease     Diabetes mellitus     Hypertension        No past surgical history on file.    Social History     Tobacco Use    Smoking status: Never     Passive exposure: Never    Smokeless tobacco: Never       No family history on file.      MEDS:  Current Outpatient Medications on File Prior to Visit   Medication Sig Dispense Refill    aspirin (ECOTRIN) 81 MG EC tablet Take 81 mg by mouth once daily.      atorvastatin (LIPITOR) 40 MG tablet Take 40 mg by mouth once daily.      clopidogreL (PLAVIX) 75 mg tablet Take 75 mg by mouth once daily.      glyBURIDE (DIABETA) 5 MG tablet Take 5 mg by mouth 2 (two) times daily.      ibuprofen (ADVIL,MOTRIN) 200 MG tablet Take 200 mg by mouth every 6 (six) hours as needed for Pain.      metFORMIN (GLUCOPHAGE) 1000 MG tablet Take 1,000 mg by mouth every morning. Take with 1/2 tablet of 500 mg(1250 mg)      metFORMIN (GLUCOPHAGE) 500 MG tablet Take 250 mg by mouth 2 (two) times a day.      metoprolol tartrate (LOPRESSOR) 100 MG tablet Take 50 mg by mouth 2 (two) times daily.      ramipriL (ALTACE) 2.5 MG capsule Take 2.5 mg by mouth every evening.       No current facility-administered medications on  file prior to visit.       ALLERGIES:  Review of patient's allergies indicates:  No Known Allergies      VITALS:  Vitals:    03/15/24 1407   BP: 96/63   Pulse: 81   Temp: 97.7 °F (36.5 °C)         PHYSICAL EXAM:  Physical Exam  Constitutional:       Appearance: Normal appearance.   HENT:      Head: Normocephalic and atraumatic.   Pulmonary:      Effort: Pulmonary effort is normal.   Abdominal:      Palpations: Abdomen is soft.      Comments: Tube in place with bilious output    Neurological:      Mental Status: He is oriented to person, place, and time. Mental status is at baseline.           LABS:  Lab Results   Component Value Date    WBC 9.77 03/01/2024    RBC 4.11 (L) 03/01/2024    HGB 12.4 (L) 03/01/2024    HCT 36.1 (L) 03/01/2024     03/01/2024     Lab Results   Component Value Date     (H) 03/01/2024     03/01/2024    K 3.9 03/01/2024     03/01/2024    CO2 23 03/01/2024    BUN 12 03/01/2024    CREATININE 1.1 03/01/2024    CALCIUM 8.9 03/01/2024     Lab Results   Component Value Date    ALT 26 03/01/2024    AST 30 03/01/2024    ALKPHOS 71 03/01/2024    BILITOT 1.0 03/01/2024     Lab Results   Component Value Date    MG 1.4 (L) 03/01/2024    PHOS 2.9 03/01/2024       STUDIES:  IR, CT, U/S images and reports were personally reviewed.        ASSESSMENT & PLAN:  73 y.o. male will plan for cholecystectomy in 6 weeks if ECHO acceptable.      Wily Ugarte M.D., F.A.C.S.  [facility] and General Surgery  [facility]

## 2024-03-18 ENCOUNTER — HOSPITAL ENCOUNTER (OUTPATIENT)
Dept: CARDIOLOGY | Facility: OTHER | Age: 74
Discharge: HOME | End: 2024-03-18
Attending: INTERNAL MEDICINE
Payer: MEDICARE

## 2024-03-18 VITALS
BODY MASS INDEX: 25.58 KG/M2 | HEIGHT: 67 IN | SYSTOLIC BLOOD PRESSURE: 97 MMHG | WEIGHT: 163 LBS | DIASTOLIC BLOOD PRESSURE: 62 MMHG

## 2024-03-18 DIAGNOSIS — I25.10 CORONARY ARTERY DISEASE INVOLVING NATIVE CORONARY ARTERY OF NATIVE HEART WITHOUT ANGINA PECTORIS: ICD-10-CM

## 2024-03-18 DIAGNOSIS — I10 PRIMARY HYPERTENSION: ICD-10-CM

## 2024-03-18 LAB
AORTIC ROOT ANNULUS: 2.57 CM
ASCENDING AORTA: 2.21 CM
AV INDEX (PROSTH): 0.8
AV MEAN GRADIENT: 2 MMHG
AV PEAK GRADIENT: 4 MMHG
AV VALVE AREA BY VELOCITY RATIO: 2.8 CM²
AV VALVE AREA: 2.43 CM²
AV VELOCITY RATIO: 0.92
BSA FOR ECHO PROCEDURE: 1.87 M2
CV ECHO LV RWT: 0.42 CM
DOP CALC AO PEAK VEL: 1 M/S
DOP CALC AO VTI: 24.6 CM
DOP CALC LVOT AREA: 3 CM2
DOP CALC LVOT DIAMETER: 1.97 CM
DOP CALC LVOT PEAK VEL: 0.92 M/S
DOP CALC LVOT STROKE VOLUME: 59.71 CM3
DOP CALCLVOT PEAK VEL VTI: 19.6 CM
E WAVE DECELERATION TIME: 290.26 MSEC
E/A RATIO: 1.03
E/E' RATIO: 8.27 M/S
ECHO LV POSTERIOR WALL: 0.89 CM (ref 0.6–1.1)
FRACTIONAL SHORTENING: 31 % (ref 28–44)
INTERVENTRICULAR SEPTUM: 0.73 CM (ref 0.6–1.1)
IVC DIAMETER: 1.43 CM
IVRT: 91.34 MSEC
LA MAJOR: 4.63 CM
LA MINOR: 3.95 CM
LA WIDTH: 4.5 CM
LAAS-AP2: 15 CM2
LAAS-AP4: 12 CM2
LEFT ATRIUM SIZE: 3.14 CM
LEFT ATRIUM VOLUME INDEX MOD: 20.7 ML/M2
LEFT ATRIUM VOLUME INDEX: 27.7 ML/M2
LEFT ATRIUM VOLUME MOD: 38.33 CM3
LEFT ATRIUM VOLUME: 51.2 CM3
LEFT INTERNAL DIMENSION IN SYSTOLE: 2.88 CM (ref 2.1–4)
LEFT VENTRICLE DIASTOLIC VOLUME INDEX: 42.19 ML/M2
LEFT VENTRICLE DIASTOLIC VOLUME: 78.06 ML
LEFT VENTRICLE MASS INDEX: 55 G/M2
LEFT VENTRICLE SYSTOLIC VOLUME INDEX: 17.2 ML/M2
LEFT VENTRICLE SYSTOLIC VOLUME: 31.74 ML
LEFT VENTRICULAR INTERNAL DIMENSION IN DIASTOLE: 4.19 CM (ref 3.5–6)
LEFT VENTRICULAR MASS: 102.57 G
LV LATERAL E/E' RATIO: 7.75 M/S
LV SEPTAL E/E' RATIO: 8.86 M/S
LVOT MG: 1.65 MMHG
LVOT MV: 0.58 CM/S
MV PEAK A VEL: 0.6 M/S
MV PEAK E VEL: 0.62 M/S
MV STENOSIS PRESSURE HALF TIME: 84.18 MS
MV VALVE AREA P 1/2 METHOD: 2.61 CM2
PISA TR MAX VEL: 1.7 M/S
PV MV: 0.76 M/S
PV PEAK GRADIENT: 5 MMHG
PV PEAK VELOCITY: 1.08 M/S
RA MAJOR: 3.32 CM
RA PRESSURE ESTIMATED: 3 MMHG
RA WIDTH: 2 CM
RIGHT VENTRICULAR END-DIASTOLIC DIMENSION: 1.98 CM
RV TB RVSP: 5 MMHG
RV TISSUE DOPPLER FREE WALL SYSTOLIC VELOCITY 1 (APICAL 4 CHAMBER VIEW): 18.14 CM/S
SINUS: 2.3 CM
STJ: 2.5 CM
TDI LATERAL: 0.08 M/S
TDI SEPTAL: 0.07 M/S
TDI: 0.08 M/S
TR MAX PG: 12 MMHG
TRICUSPID ANNULAR PLANE SYSTOLIC EXCURSION: 1.8 CM
TV REST PULMONARY ARTERY PRESSURE: 15 MMHG
Z-SCORE OF LEFT VENTRICULAR DIMENSION IN END DIASTOLE: -2.04
Z-SCORE OF LEFT VENTRICULAR DIMENSION IN END SYSTOLE: -0.76

## 2024-03-18 PROCEDURE — 93306 TTE W/DOPPLER COMPLETE: CPT | Mod: 26,,, | Performed by: INTERNAL MEDICINE

## 2024-03-18 PROCEDURE — 93306 TTE W/DOPPLER COMPLETE: CPT

## 2024-03-21 ENCOUNTER — TELEPHONE (OUTPATIENT)
Dept: SURGERY | Facility: CLINIC | Age: 74
End: 2024-03-21
Payer: MEDICARE

## 2024-03-21 DIAGNOSIS — K81.1 CHRONIC CHOLECYSTITIS: Primary | ICD-10-CM

## 2024-03-21 RX ORDER — CEFAZOLIN SODIUM 2 G/50ML
2 SOLUTION INTRAVENOUS
Status: CANCELLED | OUTPATIENT
Start: 2024-03-21

## 2024-03-21 RX ORDER — SODIUM CHLORIDE 9 MG/ML
INJECTION, SOLUTION INTRAVENOUS CONTINUOUS
Status: CANCELLED | OUTPATIENT
Start: 2024-03-21

## 2024-03-21 NOTE — TELEPHONE ENCOUNTER
Echo noted, will plan for surgery on April 12th, pt must hold plavix after Sunday the 7th, wife updated and states understanding

## 2024-03-26 ENCOUNTER — HOSPITAL ENCOUNTER (OUTPATIENT)
Dept: PREADMISSION TESTING | Facility: HOSPITAL | Age: 74
Discharge: HOME | End: 2024-03-26
Attending: NURSE PRACTITIONER
Payer: MEDICARE

## 2024-03-26 ENCOUNTER — ANESTHESIA EVENT (OUTPATIENT)
Dept: SURGERY | Facility: HOSPITAL | Age: 74
End: 2024-03-26
Payer: MEDICARE

## 2024-03-26 RX ORDER — CALCIUM CARBONATE 300MG(750)
1 TABLET,CHEWABLE ORAL 2 TIMES DAILY
COMMUNITY
Start: 2024-03-01

## 2024-03-26 NOTE — DISCHARGE INSTRUCTIONS
Your surgery is scheduled for 4/12/24.    Please report to Hospital Front Lobby on the 1st Floor at 530 a.m.    THIS TIME IS SUBJECT TO CHANGE.  YOU WILL RECEIVE A PHONE CALL THE DAY BEFORE SURGERY BY 3:30 PM TO CONFIRM YOUR TIME OF ARRIVAL.  IF YOU HAVE NOT RECEIVED A PHONE CALL BY 3:30 PM THE DAY BEFORE YOUR SURGERY PLEASE CALL [phone]     INSTRUCTIONS IMPORTANT!!!  ¨ Do not eat or drink after 12 midnight-including water, candy, gum, & mints. OK to brush teeth.      ____  Proceed to [facility] on *** for additional testing.        ____  Do not wear makeup, including mascara.  ____  No powder, lotions or creams to surgical area.  ____  Please remove all jewelry, including piercings and leave at home.  ____  No money or valuables needed. Please leave at home.  ____  Please bring any documents given by your doctor.  ____  If going home the same day, arrange for a ride home. You will not be able to             drive if Anesthesia was used.  ____  Children under 18 years require a parent / guardian present the entire time             they are in surgery / recovery.  ____  Wear loose fitting clothing. Allow for dressings, bandages.  ____  Stop Aspirin, Ibuprofen, Motrin, Aleve, Goody's/BC powders, Excedrine and Naproxen (NSAIDS) at least 3-5 days before surgery, unless otherwise instructed by your doctor, or the nurse.   You MAY use Tylenol/acetaminophen until day of surgery.  ____  Wash the surgical area with Hibiclens or Dial Antibacterial bar soap the night before surgery, and again the             morning of surgery.  Be sure to rinse hibiclens or Dial Antibacterial bar soap off completely (if instructed by   nurse).  ____  If you take diabetic medication including Metformin, Glimepiride, Glipizide, Glyburide, Byetta, Januvia, Actos, do not take am of surgery unless instructed by Doctor.  ____ Hold Invokana, Farxiga, and Jardiance for 3 days prior to surgery.   ____  Call MD for temperature  above 101 degrees or any other signs of infection such as Urinary (bladder) infection, Upper respiratory infection, skin boils, etc.  ____ Stop taking any Fish Oil supplement or any Vitamins that contain Vitamin E at least 5 days prior to surgery.  ____ Do Not wear your contact lenses the day of your procedure.  You may wear your glasses.      ____Do not shave surgical site for 3 days prior to surgery.  ____ Practice Good hand washing before, during, and after procedure.      I have read or had read and explained to me, and understand the above information.  Additional comments or instructions:  For additional questions call [phone]      ANESTHESIA SIDE EFFECTS  -For the first 24 hours after surgery:  Do not drive, use heavy equipment, make important decisions, or drink alcohol  -It is normal to feel sleepy for several hours.  Rest until you are more awake.  -Have someone stay with you, if needed.  They can watch for problems and help keep you safe.  -Some possible post anesthesia side effects include: nausea and vomiting, sore throat and hoarseness, sleepiness, and dizziness.        Pre-Op Bathing Instructions    Before surgery, you can play an important role in your own health.    Because skin is not sterile, we need to be sure that your skin is as free of germs as possible. By following the instructions below, you can reduce the number of germs on your skin before surgery.    IMPORTANT: You will need to shower with a special soap called Hibiclens*, available at any pharmacy.  If you are allergic to Chlorhexidine (the antiseptic in Hibiclens), use an antibacterial soap such as Dial Soap for your preoperative shower.  You will shower with Hibiclens both the night before your surgery and the morning of your surgery.  Do not use Hibiclens on the head, face or genitals to avoid injury to those areas.    STEP #1: THE NIGHT BEFORE YOUR SURGERY     Do not shave the area of your body where your surgery will be  performed.  Shower and wash your hair and body as usual with your normal soap and shampoo.  Rinse your hair and body thoroughly after you shower to remove all soap residue.  With your hand, apply one packet of Hibiclens soap to the surgical site.   Wash the site gently for five (5) minutes. Do not scrub your skin too hard.   Do not wash with your regular soap after Hibiclens is used.  Rinse your body thoroughly.  Pat yourself dry with a clean, soft towel.  Do not use lotion, cream, or powder.  Wear clean clothes.    STEP #2: THE MORNING OF YOUR SURGERY     Repeat Step #1.    * Not to be used by people allergic to Chlorhexidine.      Please take Metoprolol the morning of surgery.

## 2024-03-26 NOTE — PRE-PROCEDURE INSTRUCTIONS
Wife.    Allergies, medical, surgical, family and psychosocial histories reviewed with patient. Periop plan of care reviewed. Preop instructions given, including medications to take and to hold. Hibiclens soap and instructions on use given. Time allotted for questions to be addressed.  Patient verbalized understanding.    Arrival time 0530.

## 2024-03-26 NOTE — ANESTHESIA PREPROCEDURE EVALUATION
"                                                                                                             04/12/2024  Yoan Coyne is a 73 y.o., male scheduled for ROBOTIC CHOLECYSTECTOMY (Abdomen) 4/12/24.    Per cardiology Dr. Mason, "Pt optimized without CV symptoms. Check TTE to reaffirm suspicion for nml cardiac function. Otherwise no indication for further CV testing prior to anticipated lap israel".    Past Medical History:   Diagnosis Date    Coronary artery disease     Diabetes mellitus     Hypertension      History reviewed. No pertinent surgical history.  Review of patient's allergies indicates:  No Known Allergies  Current Outpatient Medications   Medication Instructions    aspirin (ECOTRIN) 81 mg, Oral, Daily    atorvastatin (LIPITOR) 40 mg, Oral, Daily    clopidogreL (PLAVIX) 75 mg, Oral, Daily    glyBURIDE (DIABETA) 5 mg, Oral, 2 times daily    ibuprofen (ADVIL,MOTRIN) 200 mg, Oral, Every 6 hours PRN    magnesium oxide 400 mg magnesium Tab 1 tablet, Oral, 2 times daily    metFORMIN (GLUCOPHAGE) 1,000 mg, Oral, Every morning, Take with 1/2 tablet of 500 mg(1250 mg)    metFORMIN (GLUCOPHAGE) 250 mg, Oral, 2 times daily    metoprolol tartrate (LOPRESSOR) 50 mg, Oral, 2 times daily    ramipriL (ALTACE) 2.5 mg, Oral, Nightly         Pre-op Assessment    I have reviewed the Patient Summary Reports.     I have reviewed the Nursing Notes. I have reviewed the NPO Status.   I have reviewed the Medications.     Review of Systems  Anesthesia Hx:    EJ hernia surgery - delayed emergence and then had surgical complication (sounds like bleeding) and emergent take back same day.  History of prior surgery of interest to airway management or planning:           Personal Hx of Anesthesia complications          Slow To Awaken/Delayed Emergence          Social:  Non-Smoker, Social Alcohol Use       Hematology/Oncology:    Oncology Normal    -- Anemia:                                  Cardiovascular:  Exercise " tolerance: good   Denies Pacemaker. Hypertension  Past MI CAD   CABG/stent (3 stents in 2010)        hyperlipidemia                             Pulmonary:  Pulmonary Normal      Denies Shortness of breath.   Denies Sleep Apnea.                Renal/:  Renal/ Normal  Denies Chronic Renal Disease.                Hepatic/GI:     GERD             Neurological:  Denies TIA.  Denies CVA.    Denies Seizures.    Neurocognitive disorder vs. Dementia?                             Endocrine:  Diabetes (A1C 7.2 on 2/29/24), type 2 Denies Hypothyroidism.  Denies Hyperthyroidism.             Physical Exam  General: Cooperative, Well nourished, Alert and Oriented    Airway:  Mallampati: II   Mouth Opening: Normal  TM Distance: Normal  Tongue: Normal    Dental:  Dentures  Upper dentures    Lab Results   Component Value Date    WBC 9.77 03/01/2024    HGB 12.4 (L) 03/01/2024    HCT 36.1 (L) 03/01/2024     03/01/2024    ALT 26 03/01/2024    AST 30 03/01/2024     03/01/2024    K 3.9 03/01/2024     03/01/2024    CREATININE 1.1 03/01/2024    BUN 12 03/01/2024    CO2 23 03/01/2024    INR 1.1 02/29/2024    HGBA1C 7.2 (H) 02/29/2024     Results for orders placed or performed during the hospital encounter of 02/29/24   EKG 12-lead    Collection Time: 02/29/24  8:33 AM   Result Value Ref Range    QRS Duration 82 ms    OHS QTC Calculation 410 ms    Narrative    Test Reason : R10.13,    Vent. Rate : 061 BPM     Atrial Rate : 061 BPM     P-R Int : 150 ms          QRS Dur : 082 ms      QT Int : 408 ms       P-R-T Axes : 076 024 082 degrees     QTc Int : 410 ms    Normal sinus rhythm  Possible Lateral infarct ,age undetermined  Abnormal ECG  When compared with ECG of 14-SEP-2000 11:28,  Borderline criteria for Lateral infarct are now Present  Nonspecific T wave abnormality no longer evident in Inferior leads  Confirmed by Tono Bolton MD ([id number]) on 2/29/2024 1:19:48 PM    Referred By: [id number]   SELF           Confirmed  By:Tono Bolton MD         Anesthesia Plan  Type of Anesthesia, risks & benefits discussed:    Anesthesia Type: Gen ETT  Intra-op Monitoring Plan: Standard ASA Monitors  Post Op Pain Control Plan: multimodal analgesia and IV/PO Opioids PRN  Induction:  IV  Airway Plan: Video, Post-Induction  Informed Consent: Informed consent signed with the Patient and all parties understand the risks and agree with anesthesia plan.  All questions answered.   ASA Score: 3  Day of Surgery Review of History & Physical: H&P Update referred to the surgeon/provider.  Anesthesia Plan Notes: Anesthesia consent to be signed prior to surgery 4/12/24    Will avoid long acting medications.  Discussed increased risk of post-op cognitive dysfunction and to continue to follow with primary care.     Ready For Surgery From Anesthesia Perspective.     .

## 2024-04-12 ENCOUNTER — HOSPITAL ENCOUNTER (OUTPATIENT)
Facility: HOSPITAL | Age: 74
Discharge: HOME | End: 2024-04-12
Attending: SURGERY | Admitting: SURGERY
Payer: MEDICARE

## 2024-04-12 ENCOUNTER — ANESTHESIA (OUTPATIENT)
Dept: SURGERY | Facility: HOSPITAL | Age: 74
End: 2024-04-12
Payer: MEDICARE

## 2024-04-12 VITALS
HEIGHT: 67 IN | OXYGEN SATURATION: 98 % | WEIGHT: 160 LBS | SYSTOLIC BLOOD PRESSURE: 107 MMHG | BODY MASS INDEX: 25.11 KG/M2 | TEMPERATURE: 98 F | DIASTOLIC BLOOD PRESSURE: 58 MMHG | HEART RATE: 76 BPM | RESPIRATION RATE: 18 BRPM

## 2024-04-12 DIAGNOSIS — K80.00 ACUTE CHOLECYSTITIS DUE TO BILIARY CALCULUS: Primary | ICD-10-CM

## 2024-04-12 DIAGNOSIS — K81.1 CHRONIC CHOLECYSTITIS: ICD-10-CM

## 2024-04-12 PROCEDURE — 63600175 PHARM REV CODE 636 W HCPCS: Mod: JG | Performed by: SURGERY

## 2024-04-12 PROCEDURE — D9220A PRA ANESTHESIA: Mod: CRNA,,, | Performed by: NURSE ANESTHETIST, CERTIFIED REGISTERED

## 2024-04-12 PROCEDURE — 27201423 OPTIME MED/SURG SUP & DEVICES STERILE SUPPLY: Performed by: SURGERY

## 2024-04-12 PROCEDURE — 47562 LAPAROSCOPIC CHOLECYSTECTOMY: CPT | Mod: GC,,, | Performed by: SURGERY

## 2024-04-12 PROCEDURE — 37000008 HC ANESTHESIA 1ST 15 MINUTES: Performed by: SURGERY

## 2024-04-12 PROCEDURE — 36000711: Performed by: SURGERY

## 2024-04-12 PROCEDURE — 37000009 HC ANESTHESIA EA ADD 15 MINS: Performed by: SURGERY

## 2024-04-12 PROCEDURE — 88304 TISSUE EXAM BY PATHOLOGIST: CPT | Performed by: PATHOLOGY

## 2024-04-12 PROCEDURE — 36000710: Performed by: SURGERY

## 2024-04-12 PROCEDURE — 88300 SURGICAL PATH GROSS: CPT | Mod: 26,,, | Performed by: PATHOLOGY

## 2024-04-12 PROCEDURE — 71000016 HC POSTOP RECOV ADDL HR: Performed by: SURGERY

## 2024-04-12 PROCEDURE — 71000015 HC POSTOP RECOV 1ST HR: Performed by: SURGERY

## 2024-04-12 PROCEDURE — 25000003 PHARM REV CODE 250: Performed by: STUDENT IN AN ORGANIZED HEALTH CARE EDUCATION/TRAINING PROGRAM

## 2024-04-12 PROCEDURE — 25000003 PHARM REV CODE 250: Performed by: NURSE ANESTHETIST, CERTIFIED REGISTERED

## 2024-04-12 PROCEDURE — 63600175 PHARM REV CODE 636 W HCPCS: Performed by: NURSE ANESTHETIST, CERTIFIED REGISTERED

## 2024-04-12 PROCEDURE — D9220A PRA ANESTHESIA: Mod: ANES,,, | Performed by: STUDENT IN AN ORGANIZED HEALTH CARE EDUCATION/TRAINING PROGRAM

## 2024-04-12 PROCEDURE — 71000033 HC RECOVERY, INTIAL HOUR: Performed by: SURGERY

## 2024-04-12 PROCEDURE — 25000003 PHARM REV CODE 250: Performed by: SURGERY

## 2024-04-12 PROCEDURE — 63600175 PHARM REV CODE 636 W HCPCS: Performed by: NURSE PRACTITIONER

## 2024-04-12 PROCEDURE — 63600175 PHARM REV CODE 636 W HCPCS: Performed by: STUDENT IN AN ORGANIZED HEALTH CARE EDUCATION/TRAINING PROGRAM

## 2024-04-12 PROCEDURE — 88300 SURGICAL PATH GROSS: CPT | Performed by: PATHOLOGY

## 2024-04-12 PROCEDURE — 88304 TISSUE EXAM BY PATHOLOGIST: CPT | Mod: 26,,, | Performed by: PATHOLOGY

## 2024-04-12 RX ORDER — PROPOFOL 10 MG/ML
VIAL (ML) INTRAVENOUS
Status: DISCONTINUED | OUTPATIENT
Start: 2024-04-12 | End: 2024-04-12

## 2024-04-12 RX ORDER — SODIUM CHLORIDE 9 MG/ML
INJECTION, SOLUTION INTRAVENOUS CONTINUOUS
Status: DISCONTINUED | OUTPATIENT
Start: 2024-04-12 | End: 2024-04-15 | Stop reason: HOSPADM

## 2024-04-12 RX ORDER — OXYCODONE AND ACETAMINOPHEN 5; 325 MG/1; MG/1
1 TABLET ORAL EVERY 6 HOURS PRN
Qty: 16 TABLET | Refills: 0 | Status: SHIPPED | OUTPATIENT
Start: 2024-04-12 | End: 2024-04-19

## 2024-04-12 RX ORDER — INDOCYANINE GREEN AND WATER 25 MG
5 KIT INJECTION ONCE
Status: COMPLETED | OUTPATIENT
Start: 2024-04-12 | End: 2024-04-12

## 2024-04-12 RX ORDER — PHENYLEPHRINE HYDROCHLORIDE 10 MG/ML
INJECTION INTRAVENOUS
Status: DISCONTINUED | OUTPATIENT
Start: 2024-04-12 | End: 2024-04-12

## 2024-04-12 RX ORDER — HALOPERIDOL 5 MG/ML
0.5 INJECTION INTRAMUSCULAR EVERY 10 MIN PRN
Status: DISCONTINUED | OUTPATIENT
Start: 2024-04-12 | End: 2024-04-15 | Stop reason: HOSPADM

## 2024-04-12 RX ORDER — LIDOCAINE HYDROCHLORIDE 10 MG/ML
1 INJECTION, SOLUTION EPIDURAL; INFILTRATION; INTRACAUDAL; PERINEURAL ONCE
Status: DISCONTINUED | OUTPATIENT
Start: 2024-04-12 | End: 2024-04-15 | Stop reason: HOSPADM

## 2024-04-12 RX ORDER — OXYCODONE HYDROCHLORIDE 5 MG/1
5 TABLET ORAL
Status: DISCONTINUED | OUTPATIENT
Start: 2024-04-12 | End: 2024-04-15 | Stop reason: HOSPADM

## 2024-04-12 RX ORDER — LIDOCAINE HYDROCHLORIDE 10 MG/ML
INJECTION INFILTRATION; PERINEURAL
Status: DISCONTINUED | OUTPATIENT
Start: 2024-04-12 | End: 2024-04-12 | Stop reason: HOSPADM

## 2024-04-12 RX ORDER — FENTANYL CITRATE 50 UG/ML
INJECTION, SOLUTION INTRAMUSCULAR; INTRAVENOUS
Status: DISCONTINUED | OUTPATIENT
Start: 2024-04-12 | End: 2024-04-12

## 2024-04-12 RX ORDER — DEXAMETHASONE SODIUM PHOSPHATE 4 MG/ML
INJECTION, SOLUTION INTRA-ARTICULAR; INTRALESIONAL; INTRAMUSCULAR; INTRAVENOUS; SOFT TISSUE
Status: DISCONTINUED | OUTPATIENT
Start: 2024-04-12 | End: 2024-04-12

## 2024-04-12 RX ORDER — ACETAMINOPHEN 500 MG
1000 TABLET ORAL ONCE
Status: COMPLETED | OUTPATIENT
Start: 2024-04-12 | End: 2024-04-12

## 2024-04-12 RX ORDER — FENTANYL CITRATE 50 UG/ML
25 INJECTION, SOLUTION INTRAMUSCULAR; INTRAVENOUS EVERY 5 MIN PRN
Status: DISCONTINUED | OUTPATIENT
Start: 2024-04-12 | End: 2024-04-15 | Stop reason: HOSPADM

## 2024-04-12 RX ORDER — LIDOCAINE HYDROCHLORIDE 20 MG/ML
INJECTION, SOLUTION EPIDURAL; INFILTRATION; INTRACAUDAL; PERINEURAL
Status: DISCONTINUED | OUTPATIENT
Start: 2024-04-12 | End: 2024-04-12

## 2024-04-12 RX ORDER — CEFAZOLIN SODIUM 2 G/50ML
2 SOLUTION INTRAVENOUS
Status: DISCONTINUED | OUTPATIENT
Start: 2024-04-12 | End: 2024-04-15 | Stop reason: HOSPADM

## 2024-04-12 RX ORDER — ONDANSETRON HYDROCHLORIDE 2 MG/ML
INJECTION, SOLUTION INTRAVENOUS
Status: DISCONTINUED | OUTPATIENT
Start: 2024-04-12 | End: 2024-04-12

## 2024-04-12 RX ORDER — HYDROMORPHONE HYDROCHLORIDE 2 MG/ML
0.2 INJECTION, SOLUTION INTRAMUSCULAR; INTRAVENOUS; SUBCUTANEOUS EVERY 5 MIN PRN
Status: DISCONTINUED | OUTPATIENT
Start: 2024-04-12 | End: 2024-04-15 | Stop reason: HOSPADM

## 2024-04-12 RX ORDER — SODIUM CHLORIDE, SODIUM LACTATE, POTASSIUM CHLORIDE, CALCIUM CHLORIDE 600; 310; 30; 20 MG/100ML; MG/100ML; MG/100ML; MG/100ML
INJECTION, SOLUTION INTRAVENOUS CONTINUOUS
Status: DISCONTINUED | OUTPATIENT
Start: 2024-04-12 | End: 2024-04-15 | Stop reason: HOSPADM

## 2024-04-12 RX ORDER — KETOROLAC TROMETHAMINE 30 MG/ML
INJECTION, SOLUTION INTRAMUSCULAR; INTRAVENOUS
Status: DISCONTINUED | OUTPATIENT
Start: 2024-04-12 | End: 2024-04-12

## 2024-04-12 RX ORDER — ROCURONIUM BROMIDE 10 MG/ML
INJECTION, SOLUTION INTRAVENOUS
Status: DISCONTINUED | OUTPATIENT
Start: 2024-04-12 | End: 2024-04-12

## 2024-04-12 RX ORDER — GLYCOPYRROLATE 0.2 MG/ML
INJECTION INTRAMUSCULAR; INTRAVENOUS
Status: DISCONTINUED | OUTPATIENT
Start: 2024-04-12 | End: 2024-04-12

## 2024-04-12 RX ORDER — CEFAZOLIN SODIUM 1 G/3ML
INJECTION, POWDER, FOR SOLUTION INTRAMUSCULAR; INTRAVENOUS
Status: DISCONTINUED | OUTPATIENT
Start: 2024-04-12 | End: 2024-04-12

## 2024-04-12 RX ORDER — BUPIVACAINE HYDROCHLORIDE 5 MG/ML
INJECTION, SOLUTION PERINEURAL
Status: DISCONTINUED | OUTPATIENT
Start: 2024-04-12 | End: 2024-04-12 | Stop reason: HOSPADM

## 2024-04-12 RX ADMIN — SUGAMMADEX 200 MG: 100 INJECTION, SOLUTION INTRAVENOUS at 08:04

## 2024-04-12 RX ADMIN — OXYCODONE 5 MG: 5 TABLET ORAL at 09:04

## 2024-04-12 RX ADMIN — PHENYLEPHRINE HYDROCHLORIDE 100 MCG: 10 INJECTION INTRAVENOUS at 07:04

## 2024-04-12 RX ADMIN — FENTANYL CITRATE 50 MCG: 50 INJECTION INTRAMUSCULAR; INTRAVENOUS at 08:04

## 2024-04-12 RX ADMIN — PROPOFOL 100 MG: 10 INJECTION, EMULSION INTRAVENOUS at 07:04

## 2024-04-12 RX ADMIN — LIDOCAINE HYDROCHLORIDE 5 ML: 20 INJECTION, SOLUTION EPIDURAL; INFILTRATION; INTRACAUDAL; PERINEURAL at 07:04

## 2024-04-12 RX ADMIN — SODIUM CHLORIDE, SODIUM LACTATE, POTASSIUM CHLORIDE, AND CALCIUM CHLORIDE: .6; .31; .03; .02 INJECTION, SOLUTION INTRAVENOUS at 08:04

## 2024-04-12 RX ADMIN — HYDROMORPHONE HYDROCHLORIDE 0.2 MG: 2 INJECTION, SOLUTION INTRAMUSCULAR; INTRAVENOUS; SUBCUTANEOUS at 09:04

## 2024-04-12 RX ADMIN — DEXAMETHASONE SODIUM PHOSPHATE 8 MG: 4 INJECTION, SOLUTION INTRA-ARTICULAR; INTRALESIONAL; INTRAMUSCULAR; INTRAVENOUS; SOFT TISSUE at 07:04

## 2024-04-12 RX ADMIN — SODIUM CHLORIDE, SODIUM LACTATE, POTASSIUM CHLORIDE, AND CALCIUM CHLORIDE: .6; .31; .03; .02 INJECTION, SOLUTION INTRAVENOUS at 07:04

## 2024-04-12 RX ADMIN — FENTANYL CITRATE 50 MCG: 50 INJECTION INTRAMUSCULAR; INTRAVENOUS at 07:04

## 2024-04-12 RX ADMIN — FENTANYL CITRATE 50 MCG: 50 INJECTION INTRAMUSCULAR; INTRAVENOUS at 06:04

## 2024-04-12 RX ADMIN — GLYCOPYRROLATE 0.2 MG: 0.2 INJECTION INTRAMUSCULAR; INTRAVENOUS at 07:04

## 2024-04-12 RX ADMIN — ACETAMINOPHEN 1000 MG: 500 TABLET ORAL at 06:04

## 2024-04-12 RX ADMIN — CEFAZOLIN 2 G: 330 INJECTION, POWDER, FOR SOLUTION INTRAMUSCULAR; INTRAVENOUS at 07:04

## 2024-04-12 RX ADMIN — ROCURONIUM BROMIDE 50 MG: 10 INJECTION, SOLUTION INTRAVENOUS at 07:04

## 2024-04-12 RX ADMIN — INDOCYANINE GREEN AND WATER 5 MG: KIT at 06:04

## 2024-04-12 RX ADMIN — ONDANSETRON 4 MG: 2 INJECTION, SOLUTION INTRAMUSCULAR; INTRAVENOUS at 06:04

## 2024-04-12 NOTE — ANESTHESIA POSTPROCEDURE EVALUATION
Anesthesia Post Evaluation    Patient: Yoan Coyne    Procedure(s) Performed: Procedure(s) (LRB):  ROBOTIC CHOLECYSTECTOMY (N/A)  REMOVAL-TUBE (INTRA-ABDOMINAL TUBE)    Final Anesthesia Type: general      Patient location during evaluation: PACU  Patient participation: Yes- Able to Participate  Level of consciousness: awake  Post-procedure vital signs: reviewed and stable  Pain management: adequate  Airway patency: patent    PONV status at discharge: No PONV  Anesthetic complications: no      Cardiovascular status: blood pressure returned to baseline  Respiratory status: unassisted  Hydration status: euvolemic  Follow-up not needed.              Vitals Value Taken Time   /60 04/12/24 1025   Temp 36.6 °C (97.9 °F) 04/12/24 0955   Pulse 76 04/12/24 1025   Resp 16 04/12/24 1025   SpO2 97 % 04/12/24 1025         Event Time   Out of Recovery 09:45:00         Pain/Timi Score: Pain Rating Prior to Med Admin: 6 (4/12/2024  9:30 AM)  Timi Score: 10 (4/12/2024 10:25 AM)

## 2024-04-12 NOTE — ANESTHESIA PROCEDURE NOTES
Intubation    Date/Time: 4/12/2024 7:09 AM    Performed by: Brando Catalan CRNA  Authorized by: Farhan Montoya MD    Intubation:     Induction:  Intravenous    Intubated:  Postinduction    Mask Ventilation:  Easy mask    Attempts:  1    Attempted By:  CRNA    Method of Intubation:  Direct    Blade:  Díaz 3    Laryngeal View Grade: Grade I - full view of cords      Difficult Airway Encountered?: No      Complications:  None    Airway Device:  Direct and oral endotracheal tube    Airway Device Size:  7.5    Style/Cuff Inflation:  Cuffed (inflated to minimal occlusive pressure)    Tube secured:  20    Secured at:  The lips    Placement Verified By:  Auscultation and Capnometry    Complicating Factors:  None    Findings Post-Intubation:  Bilateral breath sounds, positive ETCO2 and atraumatic / condition of teeth unchanged

## 2024-04-12 NOTE — TRANSFER OF CARE
"Anesthesia Transfer of Care Note    Patient: Yoan Coyne    Procedure(s) Performed: Procedure(s) (LRB):  ROBOTIC CHOLECYSTECTOMY (N/A)  REMOVAL-TUBE (INTRA-ABDOMINAL TUBE)    Patient location: PACU    Anesthesia Type: general    Transport from OR: Transported from OR on room air with adequate spontaneous ventilation    Post pain: adequate analgesia    Post assessment: no apparent anesthetic complications and tolerated procedure well    Post vital signs: stable    Level of consciousness: awake and alert    Nausea/Vomiting: no nausea/vomiting    Complications: none    Transfer of care protocol was followed      Last vitals: Visit Vitals  BP (!) 115/59 (BP Location: Right arm, Patient Position: Lying)   Pulse 75   Temp 36.4 °C (97.5 °F) (Skin)   Resp 12   Ht 5' 7" (1.702 m)   Wt 72.6 kg (160 lb)   SpO2 100%   BMI 25.06 kg/m²     "

## 2024-04-12 NOTE — PLAN OF CARE
Pt meets discharge criteria, has voided, no problems. Discharge instructions given to pt and wife with time for questions and answers.

## 2024-04-12 NOTE — DISCHARGE SUMMARY
[facility] - [facility] (Hospital)  Discharge Note  Short Stay    Procedure(s) (LRB):  ROBOTIC CHOLECYSTECTOMY (N/A)  REMOVAL-TUBE (INTRA-ABDOMINAL TUBE)      OUTCOME: Patient tolerated treatment/procedure well without complication and is now ready for discharge.    DISPOSITION: Home or Self Care    FINAL DIAGNOSIS:  symptomatic cholelithiasis     FOLLOWUP: In clinic    DISCHARGE INSTRUCTIONS:  No discharge procedures on file.     TIME SPENT ON DISCHARGE: 10 minutes

## 2024-04-12 NOTE — OP NOTE
PATIENT: Yoan Coyne    MRN: [id number]    DATE OF PROCEDURE: 04/12/2024    PREOPERATIVE DIAGNOSIS: Symptomatic cholelithiasis     POSTOPERATIVE DIAGNOSIS: same    PROCEDURE:   Robotic cholecystectomy with Intraoperative cholangiography  Removal of Intra-abdominal Foreign Body    SURGEON: Wily Ugarte M.D.    ASSISTANT: Gerard Theodore M.D.    ANESTHESIA: General Endotracheal    ESTIMATED BLOOD LOSS: minimal    SPECIMEN: Gallbladder and contents and cholecystostomy tube end    COMPLICATIONS: None    INDICATIONS: The patient is 72 yo male. R/B/A to Robotic cholecystectomy surgery were explained to the patient in detail.  The risks include, but are not limited to: bleeding, infection, re-operation, injury to surrounding structures,reaction to anesthesia, maude-operative cardiopulmonary events including PE/DVT, bile leak, post-op diarrhea, failure to resolve symptoms, and possible death.  The patient stated a clear understanding of the risks and requests the procedure be done.    PROCEDURE IN DETAIL:  After surgical consent was obtained, the patient was transported to the operative theater and onto the operating room table in a supine position.  General endotracheal anesthesia was administered without difficulty.  The patient's abdomen was prepped and draped in a standard sterile fashion.  Appropriate perioperative antibiotics were given and a time-out was performed.  An incision was made in the periumbilical region and the fascia was elevated after blunt dissection.  It was sharply divided and the abdominal cavity was entered bluntly.  A trocar was inserted in the abdomen and it was insufflated.  Additional robotic trocars were inserted without difficulty and under direct visualization and the robot was docked.  The gallbladder was noted to be chronically inflamed.  The cholecystostomy tube was cut at the abdominal wall after taking down adhesions. The dome of the gallbladder was identified and elevated anteriorly and  towards the patient's right shoulder.  The peritoneum was scored on both sides of the infundibulum and a critical view of safety was obtained.  ICG/Firefly technology was used for cholangiography.  The cystic duct and common bile duct were illuminated and their anatomical relationship confirmed.  The cystic duct and cystic artery were the only structures seen entering the gallbladder.  They were clipped and divided.  The gallbladder was then taken off the liver bed using cautery.  The liver bed was found to be free of bleeding or bile leakage.  There was some spillage of bile and stones.  The distal cholecystostomy tube was removed from the liver.  The stones and tube end were removed in the specimen bag.  The right upper quadrant was irrigated with sterile saline.  The robot was undocked and the gallbladder was placed in a specimen bag and removed from the field without difficulty. The trocars were removed under direct visualization and the abdomen was desufflated.  The fascial defect at the umbilicus was closed using a figure-of-eight 0 Vicryl suture.  Incisions were all irrigated out with sterile saline and injected with local anesthetic.  The skin openings were closed with interrupted 4 0 Monocryl suture and the incisions were all dressed in a standard sterile fashion.  The proximal tube was removed and site was covered with a sterile dressing.  At the end of the procedure the instrument, lap, and needle counts were all correct.  The patient was awoken from anesthesia having tolerated the procedure without difficulty and was returned to the PACU in a stable condition.  Patient's family was updated at the end of the procedure and all questions were answered.      Wily Ugarte M.D., F.A.C.S.  [facility]  [facility]

## 2024-04-15 LAB
FINAL PATHOLOGIC DIAGNOSIS: NORMAL
GROSS: NORMAL
Lab: NORMAL

## 2024-04-26 ENCOUNTER — OFFICE VISIT (OUTPATIENT)
Dept: SURGERY | Facility: CLINIC | Age: 74
End: 2024-04-26
Payer: MEDICARE

## 2024-04-26 VITALS
BODY MASS INDEX: 25.22 KG/M2 | HEIGHT: 67 IN | DIASTOLIC BLOOD PRESSURE: 67 MMHG | SYSTOLIC BLOOD PRESSURE: 108 MMHG | HEART RATE: 75 BPM | WEIGHT: 160.69 LBS

## 2024-04-26 DIAGNOSIS — K80.00 ACUTE CHOLECYSTITIS DUE TO BILIARY CALCULUS: Primary | ICD-10-CM

## 2024-04-26 PROCEDURE — 99999 PR PBB SHADOW E&M-EST. PATIENT-LVL III: CPT | Mod: PBBFAC,,, | Performed by: SURGERY

## 2024-04-26 PROCEDURE — 99024 POSTOP FOLLOW-UP VISIT: CPT | Mod: POP,,, | Performed by: SURGERY

## 2024-04-26 PROCEDURE — 99213 OFFICE O/P EST LOW 20 MIN: CPT | Mod: PBBFAC,PN | Performed by: SURGERY

## 2024-04-26 NOTE — PROGRESS NOTES
Surgery Clinic Note    Subjective:     Yoan Coyne   No diagnosis found.  Review of patient's allergies indicates:  No Known Allergies    Yoan Coyne is a 73 y.o. male who presents to clinic for follow up s/p robotic cholecystectomy and cholecystostomy tube removal.  The patient reports that he is tolerating a regular diet and having regular bowel movements.  He denies fever or chills. He has no pain. He denies drainage or redness  of his incisions.       Objective:     PHYSICAL EXAM:  Vital Signs (Most Recent)  Pulse: 75 (04/26/24 1407)  BP: 108/67 (04/26/24 1407)    Physical Exam  Constitutional:       General: He is not in acute distress.     Appearance: Normal appearance. He is not ill-appearing.   HENT:      Head: Normocephalic and atraumatic.   Cardiovascular:      Rate and Rhythm: Normal rate and regular rhythm.      Pulses: Normal pulses.   Pulmonary:      Effort: Pulmonary effort is normal. No respiratory distress.   Abdominal:      General: There is no distension.      Palpations: Abdomen is soft.      Tenderness: There is no abdominal tenderness. There is no guarding.   Neurological:      General: No focal deficit present.      Mental Status: He is alert and oriented to person, place, and time.   Psychiatric:         Mood and Affect: Mood normal.         Behavior: Behavior normal.         Thought Content: Thought content normal.         Judgment: Judgment normal.       Final Pathologic Diagnosis   Date Value Ref Range Status   04/12/2024   Final    1. Gallbladder (cholecystectomy):  Cholelithiasis with chronic cholecystitis  Benign adherent liver parenchyma, minimal         Comment:     Interp By Kavitha Schneider M.D., Signed on 04/15/2024 at 11:45       Assessment:     73 y.o. male s/p robotic cholecystectomy and cholecystostomy tube removal    Plan:     - Doing well, eating, having bowel movements  - can start to get back to regular activities  - Return to clinic as needed  - All questions answered;  patient is comfortable with follow-up plan.    Gerard Theodore MD   [facility]

## 2024-09-17 ENCOUNTER — HOSPITAL ENCOUNTER (INPATIENT)
Facility: HOSPITAL | Age: 74
LOS: 3 days | Discharge: HOME | DRG: 638 | End: 2024-09-20
Attending: EMERGENCY MEDICINE | Admitting: INTERNAL MEDICINE
Payer: MEDICARE

## 2024-09-17 DIAGNOSIS — R41.89 COGNITIVE DECLINE: ICD-10-CM

## 2024-09-17 DIAGNOSIS — J18.9 PNEUMONIA OF LEFT LOWER LOBE DUE TO INFECTIOUS ORGANISM: ICD-10-CM

## 2024-09-17 DIAGNOSIS — E83.42 HYPOMAGNESEMIA: ICD-10-CM

## 2024-09-17 DIAGNOSIS — E11.9 TYPE 2 DIABETES MELLITUS WITHOUT COMPLICATION, WITHOUT LONG-TERM CURRENT USE OF INSULIN: ICD-10-CM

## 2024-09-17 DIAGNOSIS — R33.8 ACUTE URINARY RETENTION: ICD-10-CM

## 2024-09-17 DIAGNOSIS — R50.9 FEVER, UNSPECIFIED FEVER CAUSE: ICD-10-CM

## 2024-09-17 DIAGNOSIS — G93.40 ACUTE ENCEPHALOPATHY: ICD-10-CM

## 2024-09-17 DIAGNOSIS — A41.9 SEPSIS, DUE TO UNSPECIFIED ORGANISM, UNSPECIFIED WHETHER ACUTE ORGAN DYSFUNCTION PRESENT: ICD-10-CM

## 2024-09-17 DIAGNOSIS — R26.89 SHUFFLING GAIT: ICD-10-CM

## 2024-09-17 DIAGNOSIS — J18.9 LEFT LOWER LOBE PNEUMONIA: ICD-10-CM

## 2024-09-17 DIAGNOSIS — N17.0 ACUTE KIDNEY INJURY (AKI) WITH ACUTE TUBULAR NECROSIS (ATN): ICD-10-CM

## 2024-09-17 DIAGNOSIS — E87.1 HYPONATREMIA: ICD-10-CM

## 2024-09-17 DIAGNOSIS — E16.2 HYPOGLYCEMIA: Primary | ICD-10-CM

## 2024-09-17 LAB
ALBUMIN SERPL BCP-MCNC: 3.3 G/DL (ref 3.5–5.2)
ALP SERPL-CCNC: 70 U/L (ref 55–135)
ALT SERPL W/O P-5'-P-CCNC: 11 U/L (ref 10–44)
ANION GAP SERPL CALC-SCNC: 14 MMOL/L (ref 8–16)
AST SERPL-CCNC: 20 U/L (ref 10–40)
BASOPHILS # BLD AUTO: ABNORMAL K/UL (ref 0–0.2)
BASOPHILS NFR BLD: 0 % (ref 0–1.9)
BILIRUB SERPL-MCNC: 1 MG/DL (ref 0.1–1)
BNP SERPL-MCNC: 43 PG/ML (ref 0–99)
BUN SERPL-MCNC: 17 MG/DL (ref 8–23)
CALCIUM SERPL-MCNC: 9.9 MG/DL (ref 8.7–10.5)
CHLORIDE SERPL-SCNC: 104 MMOL/L (ref 95–110)
CO2 SERPL-SCNC: 16 MMOL/L (ref 23–29)
CREAT SERPL-MCNC: 1.4 MG/DL (ref 0.5–1.4)
DIFFERENTIAL METHOD BLD: ABNORMAL
EOSINOPHIL # BLD AUTO: ABNORMAL K/UL (ref 0–0.5)
EOSINOPHIL NFR BLD: 0 % (ref 0–8)
ERYTHROCYTE [DISTWIDTH] IN BLOOD BY AUTOMATED COUNT: 13 % (ref 11.5–14.5)
EST. GFR  (NO RACE VARIABLE): 54 ML/MIN/1.73 M^2
FIO2: 21 %
FIO2: 21 %
GLUCOSE SERPL-MCNC: 53 MG/DL (ref 70–110)
HCT VFR BLD AUTO: 41.2 % (ref 40–54)
HGB BLD-MCNC: 14.2 G/DL (ref 14–18)
IMM GRANULOCYTES # BLD AUTO: ABNORMAL K/UL (ref 0–0.04)
IMM GRANULOCYTES NFR BLD AUTO: ABNORMAL % (ref 0–0.5)
LACTATE SERPL-SCNC: 2.4 MMOL/L (ref 0.5–2.2)
LDH SERPL L TO P-CCNC: 1.9 MMOL/L (ref 0.5–2.2)
LYMPHOCYTES # BLD AUTO: ABNORMAL K/UL (ref 1–4.8)
LYMPHOCYTES NFR BLD: 12 % (ref 18–48)
MAGNESIUM SERPL-MCNC: 1.1 MG/DL (ref 1.6–2.6)
MCH RBC QN AUTO: 30.4 PG (ref 27–31)
MCHC RBC AUTO-ENTMCNC: 34.5 G/DL (ref 32–36)
MCV RBC AUTO: 88 FL (ref 82–98)
MONOCYTES # BLD AUTO: ABNORMAL K/UL (ref 0.3–1)
MONOCYTES NFR BLD: 9 % (ref 4–15)
NEUTROPHILS NFR BLD: 79 % (ref 38–73)
NRBC BLD-RTO: 0 /100 WBC
PCO2 BLDA: 38.4 MMHG (ref 35–45)
PH SMN: 7.39 [PH] (ref 7.35–7.45)
PLATELET # BLD AUTO: 241 K/UL (ref 150–450)
PLATELET BLD QL SMEAR: ABNORMAL
PMV BLD AUTO: 9.3 FL (ref 9.2–12.9)
PO2 BLDA: 20.1 MMHG (ref 40–60)
POC BASE DEFICIT: -1.3 MMOL/L (ref -2–2)
POC HCO3: 23.3 MMOL/L (ref 24–28)
POC PERFORMED BY: ABNORMAL
POC PERFORMED BY: NORMAL
POC SATURATED O2: 30.7 % (ref 95–100)
POCT GLUCOSE: 94 MG/DL (ref 70–110)
POTASSIUM SERPL-SCNC: 3.9 MMOL/L (ref 3.5–5.1)
PROCALCITONIN SERPL IA-MCNC: 0.19 NG/ML
PROT SERPL-MCNC: 7.7 G/DL (ref 6–8.4)
RBC # BLD AUTO: 4.67 M/UL (ref 4.6–6.2)
SARS-COV-2 RDRP RESP QL NAA+PROBE: NEGATIVE
SODIUM SERPL-SCNC: 134 MMOL/L (ref 136–145)
SPECIMEN SOURCE: ABNORMAL
SPECIMEN SOURCE: NORMAL
WBC # BLD AUTO: 17.32 K/UL (ref 3.9–12.7)

## 2024-09-17 PROCEDURE — 83605 ASSAY OF LACTIC ACID: CPT | Performed by: EMERGENCY MEDICINE

## 2024-09-17 PROCEDURE — 12000002 HC ACUTE/MED SURGE SEMI-PRIVATE ROOM

## 2024-09-17 PROCEDURE — 93010 ELECTROCARDIOGRAM REPORT: CPT | Mod: ,,, | Performed by: STUDENT IN AN ORGANIZED HEALTH CARE EDUCATION/TRAINING PROGRAM

## 2024-09-17 PROCEDURE — 96367 TX/PROPH/DG ADDL SEQ IV INF: CPT

## 2024-09-17 PROCEDURE — 83880 ASSAY OF NATRIURETIC PEPTIDE: CPT | Performed by: EMERGENCY MEDICINE

## 2024-09-17 PROCEDURE — 87040 BLOOD CULTURE FOR BACTERIA: CPT | Performed by: EMERGENCY MEDICINE

## 2024-09-17 PROCEDURE — 83605 ASSAY OF LACTIC ACID: CPT

## 2024-09-17 PROCEDURE — 25000003 PHARM REV CODE 250: Performed by: EMERGENCY MEDICINE

## 2024-09-17 PROCEDURE — 85007 BL SMEAR W/DIFF WBC COUNT: CPT | Performed by: EMERGENCY MEDICINE

## 2024-09-17 PROCEDURE — 83735 ASSAY OF MAGNESIUM: CPT | Performed by: EMERGENCY MEDICINE

## 2024-09-17 PROCEDURE — 84145 PROCALCITONIN (PCT): CPT | Performed by: EMERGENCY MEDICINE

## 2024-09-17 PROCEDURE — 96365 THER/PROPH/DIAG IV INF INIT: CPT

## 2024-09-17 PROCEDURE — 99285 EMERGENCY DEPT VISIT HI MDM: CPT | Mod: 25

## 2024-09-17 PROCEDURE — 63600175 PHARM REV CODE 636 W HCPCS: Performed by: EMERGENCY MEDICINE

## 2024-09-17 PROCEDURE — 93005 ELECTROCARDIOGRAM TRACING: CPT

## 2024-09-17 PROCEDURE — 80053 COMPREHEN METABOLIC PANEL: CPT | Performed by: EMERGENCY MEDICINE

## 2024-09-17 PROCEDURE — 85027 COMPLETE CBC AUTOMATED: CPT | Performed by: EMERGENCY MEDICINE

## 2024-09-17 PROCEDURE — 81000 URINALYSIS NONAUTO W/SCOPE: CPT | Performed by: EMERGENCY MEDICINE

## 2024-09-17 PROCEDURE — 99900035 HC TECH TIME PER 15 MIN (STAT)

## 2024-09-17 PROCEDURE — 82962 GLUCOSE BLOOD TEST: CPT

## 2024-09-17 PROCEDURE — 82803 BLOOD GASES ANY COMBINATION: CPT

## 2024-09-17 PROCEDURE — U0002 COVID-19 LAB TEST NON-CDC: HCPCS | Performed by: EMERGENCY MEDICINE

## 2024-09-17 RX ORDER — MAGNESIUM SULFATE HEPTAHYDRATE 40 MG/ML
2 INJECTION, SOLUTION INTRAVENOUS
Status: COMPLETED | OUTPATIENT
Start: 2024-09-17 | End: 2024-09-18

## 2024-09-17 RX ORDER — DEXTROSE MONOHYDRATE 100 MG/ML
INJECTION, SOLUTION INTRAVENOUS
Status: DISCONTINUED | OUTPATIENT
Start: 2024-09-17 | End: 2024-09-18

## 2024-09-17 RX ORDER — ACETAMINOPHEN 325 MG/1
650 TABLET ORAL
Status: COMPLETED | OUTPATIENT
Start: 2024-09-17 | End: 2024-09-17

## 2024-09-17 RX ADMIN — ACETAMINOPHEN 650 MG: 325 TABLET ORAL at 10:09

## 2024-09-17 RX ADMIN — SODIUM CHLORIDE 1000 ML: 9 INJECTION, SOLUTION INTRAVENOUS at 10:09

## 2024-09-17 RX ADMIN — VANCOMYCIN HYDROCHLORIDE 2000 MG: 500 INJECTION, POWDER, LYOPHILIZED, FOR SOLUTION INTRAVENOUS at 10:09

## 2024-09-17 RX ADMIN — PIPERACILLIN SODIUM AND TAZOBACTAM SODIUM 4.5 G: 4; .5 INJECTION, POWDER, LYOPHILIZED, FOR SOLUTION INTRAVENOUS at 10:09

## 2024-09-18 PROBLEM — G93.40 ACUTE ENCEPHALOPATHY: Status: ACTIVE | Noted: 2024-09-18

## 2024-09-18 PROBLEM — E16.2 HYPOGLYCEMIA: Status: ACTIVE | Noted: 2024-09-18

## 2024-09-18 PROBLEM — R33.8 ACUTE URINARY RETENTION: Status: ACTIVE | Noted: 2024-09-18

## 2024-09-18 PROBLEM — G93.40 ACUTE ENCEPHALOPATHY: Chronic | Status: ACTIVE | Noted: 2024-09-18

## 2024-09-18 PROBLEM — R41.89 COGNITIVE IMPAIRMENT: Status: ACTIVE | Noted: 2024-09-18

## 2024-09-18 PROBLEM — R26.89 SHUFFLING GAIT: Status: ACTIVE | Noted: 2024-09-18

## 2024-09-18 PROBLEM — E87.1 HYPONATREMIA: Status: ACTIVE | Noted: 2024-09-18

## 2024-09-18 PROBLEM — K21.9 GERD (GASTROESOPHAGEAL REFLUX DISEASE): Status: ACTIVE | Noted: 2024-09-18

## 2024-09-18 LAB
ANION GAP SERPL CALC-SCNC: 12 MMOL/L (ref 8–16)
ANION GAP SERPL CALC-SCNC: 9 MMOL/L (ref 8–16)
BACTERIA #/AREA URNS HPF: NORMAL /HPF
BASOPHILS # BLD AUTO: 0.03 K/UL (ref 0–0.2)
BASOPHILS NFR BLD: 0.3 % (ref 0–1.9)
BILIRUB UR QL STRIP: NEGATIVE
BUN SERPL-MCNC: 16 MG/DL (ref 8–23)
BUN SERPL-MCNC: 20 MG/DL (ref 8–23)
CALCIUM SERPL-MCNC: 9.1 MG/DL (ref 8.7–10.5)
CALCIUM SERPL-MCNC: 9.4 MG/DL (ref 8.7–10.5)
CHLORIDE SERPL-SCNC: 103 MMOL/L (ref 95–110)
CHLORIDE SERPL-SCNC: 103 MMOL/L (ref 95–110)
CHLORIDE UR-SCNC: 37 MMOL/L (ref 25–200)
CLARITY UR: ABNORMAL
CO2 SERPL-SCNC: 17 MMOL/L (ref 23–29)
CO2 SERPL-SCNC: 18 MMOL/L (ref 23–29)
COLOR UR: YELLOW
CORTIS SERPL-MCNC: 23.8 UG/DL (ref 4.3–22.4)
CREAT SERPL-MCNC: 1.5 MG/DL (ref 0.5–1.4)
CREAT SERPL-MCNC: 1.9 MG/DL (ref 0.5–1.4)
DIFFERENTIAL METHOD BLD: ABNORMAL
EOSINOPHIL # BLD AUTO: 0.1 K/UL (ref 0–0.5)
EOSINOPHIL NFR BLD: 0.4 % (ref 0–8)
ERYTHROCYTE [DISTWIDTH] IN BLOOD BY AUTOMATED COUNT: 12.7 % (ref 11.5–14.5)
EST. GFR  (NO RACE VARIABLE): 37 ML/MIN/1.73 M^2
EST. GFR  (NO RACE VARIABLE): 49 ML/MIN/1.73 M^2
ESTIMATED AVG GLUCOSE: 131 MG/DL (ref 68–131)
ETHANOL SERPL-MCNC: <10 MG/DL
GLUCOSE SERPL-MCNC: 101 MG/DL (ref 70–110)
GLUCOSE SERPL-MCNC: 331 MG/DL (ref 70–110)
GLUCOSE UR QL STRIP: NEGATIVE
HBA1C MFR BLD: 6.2 % (ref 4–5.6)
HCT VFR BLD AUTO: 37.2 % (ref 40–54)
HGB BLD-MCNC: 12.4 G/DL (ref 14–18)
HGB UR QL STRIP: ABNORMAL
HYALINE CASTS #/AREA URNS LPF: 0 /LPF
IMM GRANULOCYTES # BLD AUTO: 0.05 K/UL (ref 0–0.04)
IMM GRANULOCYTES NFR BLD AUTO: 0.4 % (ref 0–0.5)
INFLUENZA A, MOLECULAR: NOT DETECTED
INFLUENZA B, MOLECULAR: NOT DETECTED
KETONES UR QL STRIP: NEGATIVE
LACTATE SERPL-SCNC: 1.3 MMOL/L (ref 0.5–2.2)
LACTATE SERPL-SCNC: 1.8 MMOL/L (ref 0.5–2.2)
LEUKOCYTE ESTERASE UR QL STRIP: NEGATIVE
LYMPHOCYTES # BLD AUTO: 1.7 K/UL (ref 1–4.8)
LYMPHOCYTES NFR BLD: 14.7 % (ref 18–48)
MAGNESIUM SERPL-MCNC: 1.8 MG/DL (ref 1.6–2.6)
MCH RBC QN AUTO: 30.3 PG (ref 27–31)
MCHC RBC AUTO-ENTMCNC: 33.3 G/DL (ref 32–36)
MCV RBC AUTO: 91 FL (ref 82–98)
MICROSCOPIC COMMENT: NORMAL
MONOCYTES # BLD AUTO: 1.7 K/UL (ref 0.3–1)
MONOCYTES NFR BLD: 14.9 % (ref 4–15)
NEUTROPHILS # BLD AUTO: 7.9 K/UL (ref 1.8–7.7)
NEUTROPHILS NFR BLD: 69.3 % (ref 38–73)
NITRITE UR QL STRIP: NEGATIVE
NRBC BLD-RTO: 0 /100 WBC
PH UR STRIP: 6 [PH] (ref 5–8)
PHOSPHATE SERPL-MCNC: 2.6 MG/DL (ref 2.7–4.5)
PLATELET # BLD AUTO: 193 K/UL (ref 150–450)
PMV BLD AUTO: 9 FL (ref 9.2–12.9)
POCT GLUCOSE: 121 MG/DL (ref 70–110)
POCT GLUCOSE: 203 MG/DL (ref 70–110)
POCT GLUCOSE: 219 MG/DL (ref 70–110)
POCT GLUCOSE: 23 MG/DL (ref 70–110)
POCT GLUCOSE: 23 MG/DL (ref 70–110)
POCT GLUCOSE: 267 MG/DL (ref 70–110)
POCT GLUCOSE: 291 MG/DL (ref 70–110)
POCT GLUCOSE: 38 MG/DL (ref 70–110)
POCT GLUCOSE: 42 MG/DL (ref 70–110)
POCT GLUCOSE: 43 MG/DL (ref 70–110)
POTASSIUM SERPL-SCNC: 3.4 MMOL/L (ref 3.5–5.1)
POTASSIUM SERPL-SCNC: 4 MMOL/L (ref 3.5–5.1)
POTASSIUM UR-SCNC: 16 MMOL/L (ref 15–95)
PROT UR QL STRIP: ABNORMAL
RBC # BLD AUTO: 4.09 M/UL (ref 4.6–6.2)
RBC #/AREA URNS HPF: 2 /HPF (ref 0–4)
RSV AG BY MOLECULAR METHOD: NOT DETECTED
SARS-COV-2 RNA RESP QL NAA+PROBE: NOT DETECTED
SODIUM SERPL-SCNC: 130 MMOL/L (ref 136–145)
SODIUM SERPL-SCNC: 132 MMOL/L (ref 136–145)
SODIUM UR-SCNC: 38 MMOL/L (ref 20–250)
SODIUM UR-SCNC: 38 MMOL/L (ref 20–250)
SP GR UR STRIP: 1.02 (ref 1–1.03)
SQUAMOUS #/AREA URNS HPF: 0 /HPF
T4 FREE SERPL-MCNC: 0.77 NG/DL (ref 0.71–1.51)
TROPONIN I SERPL DL<=0.01 NG/ML-MCNC: 0.02 NG/ML (ref 0–0.03)
TSH SERPL DL<=0.005 MIU/L-ACNC: 0.38 UIU/ML (ref 0.4–4)
URN SPEC COLLECT METH UR: ABNORMAL
UROBILINOGEN UR STRIP-ACNC: NEGATIVE EU/DL
WBC # BLD AUTO: 11.39 K/UL (ref 3.9–12.7)
WBC #/AREA URNS HPF: 0 /HPF (ref 0–5)

## 2024-09-18 PROCEDURE — 83605 ASSAY OF LACTIC ACID: CPT | Performed by: EMERGENCY MEDICINE

## 2024-09-18 PROCEDURE — 63600175 PHARM REV CODE 636 W HCPCS: Performed by: EMERGENCY MEDICINE

## 2024-09-18 PROCEDURE — 83605 ASSAY OF LACTIC ACID: CPT | Mod: 91

## 2024-09-18 PROCEDURE — 87449 NOS EACH ORGANISM AG IA: CPT

## 2024-09-18 PROCEDURE — 85025 COMPLETE CBC W/AUTO DIFF WBC: CPT

## 2024-09-18 PROCEDURE — 63600175 PHARM REV CODE 636 W HCPCS

## 2024-09-18 PROCEDURE — 25500020 PHARM REV CODE 255: Performed by: INTERNAL MEDICINE

## 2024-09-18 PROCEDURE — 84300 ASSAY OF URINE SODIUM: CPT

## 2024-09-18 PROCEDURE — 51798 US URINE CAPACITY MEASURE: CPT

## 2024-09-18 PROCEDURE — 83735 ASSAY OF MAGNESIUM: CPT

## 2024-09-18 PROCEDURE — 97530 THERAPEUTIC ACTIVITIES: CPT

## 2024-09-18 PROCEDURE — 51701 INSERT BLADDER CATHETER: CPT

## 2024-09-18 PROCEDURE — 84439 ASSAY OF FREE THYROXINE: CPT

## 2024-09-18 PROCEDURE — 97165 OT EVAL LOW COMPLEX 30 MIN: CPT

## 2024-09-18 PROCEDURE — 25000003 PHARM REV CODE 250

## 2024-09-18 PROCEDURE — 97116 GAIT TRAINING THERAPY: CPT

## 2024-09-18 PROCEDURE — 84100 ASSAY OF PHOSPHORUS: CPT

## 2024-09-18 PROCEDURE — 82436 ASSAY OF URINE CHLORIDE: CPT

## 2024-09-18 PROCEDURE — 83036 HEMOGLOBIN GLYCOSYLATED A1C: CPT

## 2024-09-18 PROCEDURE — 87086 URINE CULTURE/COLONY COUNT: CPT

## 2024-09-18 PROCEDURE — 0241U SARS-COV2 (COVID) WITH FLU/RSV BY PCR: CPT

## 2024-09-18 PROCEDURE — 97535 SELF CARE MNGMENT TRAINING: CPT

## 2024-09-18 PROCEDURE — 82533 TOTAL CORTISOL: CPT

## 2024-09-18 PROCEDURE — 84443 ASSAY THYROID STIM HORMONE: CPT

## 2024-09-18 PROCEDURE — 93010 ELECTROCARDIOGRAM REPORT: CPT | Mod: ,,, | Performed by: STUDENT IN AN ORGANIZED HEALTH CARE EDUCATION/TRAINING PROGRAM

## 2024-09-18 PROCEDURE — 36415 COLL VENOUS BLD VENIPUNCTURE: CPT

## 2024-09-18 PROCEDURE — 84133 ASSAY OF URINE POTASSIUM: CPT

## 2024-09-18 PROCEDURE — 82077 ASSAY SPEC XCP UR&BREATH IA: CPT

## 2024-09-18 PROCEDURE — 84484 ASSAY OF TROPONIN QUANT: CPT

## 2024-09-18 PROCEDURE — 25000003 PHARM REV CODE 250: Performed by: EMERGENCY MEDICINE

## 2024-09-18 PROCEDURE — 93005 ELECTROCARDIOGRAM TRACING: CPT

## 2024-09-18 PROCEDURE — 11000001 HC ACUTE MED/SURG PRIVATE ROOM

## 2024-09-18 PROCEDURE — 80048 BASIC METABOLIC PNL TOTAL CA: CPT | Mod: 91

## 2024-09-18 PROCEDURE — 97161 PT EVAL LOW COMPLEX 20 MIN: CPT

## 2024-09-18 PROCEDURE — 80048 BASIC METABOLIC PNL TOTAL CA: CPT

## 2024-09-18 PROCEDURE — 87040 BLOOD CULTURE FOR BACTERIA: CPT | Performed by: EMERGENCY MEDICINE

## 2024-09-18 RX ORDER — IBUPROFEN 200 MG
16 TABLET ORAL
Status: DISCONTINUED | OUTPATIENT
Start: 2024-09-18 | End: 2024-09-18

## 2024-09-18 RX ORDER — SODIUM CHLORIDE 0.9 % (FLUSH) 0.9 %
10 SYRINGE (ML) INJECTION EVERY 12 HOURS PRN
Status: DISCONTINUED | OUTPATIENT
Start: 2024-09-18 | End: 2024-09-20 | Stop reason: HOSPADM

## 2024-09-18 RX ORDER — NALOXONE HCL 0.4 MG/ML
0.02 VIAL (ML) INJECTION
Status: DISCONTINUED | OUTPATIENT
Start: 2024-09-18 | End: 2024-09-18

## 2024-09-18 RX ORDER — ASPIRIN 81 MG/1
81 TABLET ORAL DAILY
Status: DISCONTINUED | OUTPATIENT
Start: 2024-09-18 | End: 2024-09-20 | Stop reason: HOSPADM

## 2024-09-18 RX ORDER — IBUPROFEN 200 MG
16 TABLET ORAL
Status: DISCONTINUED | OUTPATIENT
Start: 2024-09-18 | End: 2024-09-20 | Stop reason: HOSPADM

## 2024-09-18 RX ORDER — INSULIN ASPART 100 [IU]/ML
0-5 INJECTION, SOLUTION INTRAVENOUS; SUBCUTANEOUS
Status: DISCONTINUED | OUTPATIENT
Start: 2024-09-18 | End: 2024-09-18

## 2024-09-18 RX ORDER — DEXTROSE MONOHYDRATE 100 MG/ML
INJECTION, SOLUTION INTRAVENOUS CONTINUOUS
Status: DISCONTINUED | OUTPATIENT
Start: 2024-09-18 | End: 2024-09-18

## 2024-09-18 RX ORDER — DEXTROSE MONOHYDRATE 100 MG/ML
INJECTION, SOLUTION INTRAVENOUS
Status: COMPLETED | OUTPATIENT
Start: 2024-09-18 | End: 2024-09-18

## 2024-09-18 RX ORDER — IBUPROFEN 200 MG
24 TABLET ORAL
Status: DISCONTINUED | OUTPATIENT
Start: 2024-09-18 | End: 2024-09-18

## 2024-09-18 RX ORDER — INSULIN ASPART 100 [IU]/ML
0-5 INJECTION, SOLUTION INTRAVENOUS; SUBCUTANEOUS
Status: DISCONTINUED | OUTPATIENT
Start: 2024-09-18 | End: 2024-09-20 | Stop reason: HOSPADM

## 2024-09-18 RX ORDER — GLUCAGON 1 MG
1 KIT INJECTION
Status: DISCONTINUED | OUTPATIENT
Start: 2024-09-18 | End: 2024-09-18

## 2024-09-18 RX ORDER — ALUMINUM HYDROXIDE, MAGNESIUM HYDROXIDE, AND SIMETHICONE 1200; 120; 1200 MG/30ML; MG/30ML; MG/30ML
30 SUSPENSION ORAL ONCE
Status: DISCONTINUED | OUTPATIENT
Start: 2024-09-18 | End: 2024-09-18

## 2024-09-18 RX ORDER — SODIUM,POTASSIUM PHOSPHATES 280-250MG
1 POWDER IN PACKET (EA) ORAL ONCE
Status: COMPLETED | OUTPATIENT
Start: 2024-09-18 | End: 2024-09-18

## 2024-09-18 RX ORDER — ENOXAPARIN SODIUM 100 MG/ML
40 INJECTION SUBCUTANEOUS EVERY 24 HOURS
Status: DISCONTINUED | OUTPATIENT
Start: 2024-09-18 | End: 2024-09-20 | Stop reason: HOSPADM

## 2024-09-18 RX ORDER — AZITHROMYCIN 250 MG/1
500 TABLET, FILM COATED ORAL DAILY
Status: DISCONTINUED | OUTPATIENT
Start: 2024-09-18 | End: 2024-09-18

## 2024-09-18 RX ORDER — POLYETHYLENE GLYCOL 3350 17 G/17G
17 POWDER, FOR SOLUTION ORAL DAILY
Status: DISCONTINUED | OUTPATIENT
Start: 2024-09-18 | End: 2024-09-20 | Stop reason: HOSPADM

## 2024-09-18 RX ORDER — ALUMINUM HYDROXIDE, MAGNESIUM HYDROXIDE, AND SIMETHICONE 1200; 120; 1200 MG/30ML; MG/30ML; MG/30ML
30 SUSPENSION ORAL 4 TIMES DAILY PRN
Status: DISCONTINUED | OUTPATIENT
Start: 2024-09-18 | End: 2024-09-18

## 2024-09-18 RX ORDER — CLOPIDOGREL BISULFATE 75 MG/1
75 TABLET ORAL DAILY
Status: DISCONTINUED | OUTPATIENT
Start: 2024-09-18 | End: 2024-09-20 | Stop reason: HOSPADM

## 2024-09-18 RX ORDER — GLUCAGON 1 MG
1 KIT INJECTION
Status: DISCONTINUED | OUTPATIENT
Start: 2024-09-18 | End: 2024-09-20 | Stop reason: HOSPADM

## 2024-09-18 RX ORDER — ONDANSETRON HYDROCHLORIDE 2 MG/ML
4 INJECTION, SOLUTION INTRAVENOUS EVERY 8 HOURS PRN
Status: DISCONTINUED | OUTPATIENT
Start: 2024-09-18 | End: 2024-09-20 | Stop reason: HOSPADM

## 2024-09-18 RX ORDER — LIDOCAINE HYDROCHLORIDE 20 MG/ML
15 SOLUTION OROPHARYNGEAL ONCE
Status: DISCONTINUED | OUTPATIENT
Start: 2024-09-18 | End: 2024-09-18

## 2024-09-18 RX ORDER — LANOLIN ALCOHOL/MO/W.PET/CERES
800 CREAM (GRAM) TOPICAL
Status: DISCONTINUED | OUTPATIENT
Start: 2024-09-18 | End: 2024-09-18

## 2024-09-18 RX ORDER — FAMOTIDINE 20 MG/1
20 TABLET, FILM COATED ORAL DAILY
Status: DISCONTINUED | OUTPATIENT
Start: 2024-09-18 | End: 2024-09-18

## 2024-09-18 RX ADMIN — DEXTROSE MONOHYDRATE: 100 INJECTION, SOLUTION INTRAVENOUS at 09:09

## 2024-09-18 RX ADMIN — SODIUM CHLORIDE, POTASSIUM CHLORIDE, SODIUM LACTATE AND CALCIUM CHLORIDE 500 ML: 600; 310; 30; 20 INJECTION, SOLUTION INTRAVENOUS at 03:09

## 2024-09-18 RX ADMIN — IOHEXOL 100 ML: 350 INJECTION, SOLUTION INTRAVENOUS at 02:09

## 2024-09-18 RX ADMIN — DEXTROSE MONOHYDRATE: 100 INJECTION, SOLUTION INTRAVENOUS at 06:09

## 2024-09-18 RX ADMIN — MAGNESIUM SULFATE HEPTAHYDRATE 2 G: 40 INJECTION, SOLUTION INTRAVENOUS at 12:09

## 2024-09-18 RX ADMIN — ASPIRIN 81 MG: 81 TABLET, COATED ORAL at 09:09

## 2024-09-18 RX ADMIN — FAMOTIDINE 20 MG: 20 TABLET ORAL at 09:09

## 2024-09-18 RX ADMIN — DEXTROSE MONOHYDRATE 25 G: 25 INJECTION, SOLUTION INTRAVENOUS at 03:09

## 2024-09-18 RX ADMIN — DEXTROSE MONOHYDRATE 125 ML: 100 INJECTION, SOLUTION INTRAVENOUS at 06:09

## 2024-09-18 RX ADMIN — ENOXAPARIN SODIUM 40 MG: 40 INJECTION SUBCUTANEOUS at 04:09

## 2024-09-18 RX ADMIN — DEXTROSE 250 ML: 10 SOLUTION INTRAVENOUS at 03:09

## 2024-09-18 RX ADMIN — DEXTROSE MONOHYDRATE: 100 INJECTION, SOLUTION INTRAVENOUS at 01:09

## 2024-09-18 RX ADMIN — INSULIN ASPART 1 UNITS: 100 INJECTION, SOLUTION INTRAVENOUS; SUBCUTANEOUS at 11:09

## 2024-09-18 RX ADMIN — POTASSIUM & SODIUM PHOSPHATES POWDER PACK 280-160-250 MG 1 PACKET: 280-160-250 PACK at 09:09

## 2024-09-18 RX ADMIN — CLOPIDOGREL BISULFATE 75 MG: 75 TABLET ORAL at 09:09

## 2024-09-18 NOTE — NURSING
Latest accuchk 121. Still on D10% 75ml/hr. Complained of sharp lower abdominal pain 4/10. Dr. Bolton informed.

## 2024-09-18 NOTE — ED NOTES
Rechecked patient's cbg due to him receiving D5W with the antibiotics. Patient asymptomatic. Sent message to MD.

## 2024-09-18 NOTE — PHARMACY MED REC
"  [facility]           Pharmacy  Admission Medication History     The home medication history was taken by Darlene Machuca.      Medication history obtained from Medications listed below were obtained from: Patient/family and Medications brought from home    Based on information gathered for medication list, you may go to "Admission" then "Reconcile Home Medications" tabs to review and/or act upon those items.     The home medication list has been updated by the Pharmacy department.   Please read ALL comments highlighted in yellow.   Please address this information as you see fit.    Feel free to contact us if you have any questions or require assistance.    The medications listed below were removed from the home medication list.  Please reorder if appropriate:    Patient reports NOT TAKING the following medication(s):  Mag ox 400mg      No current facility-administered medications on file prior to encounter.     Current Outpatient Medications on File Prior to Encounter   Medication Sig Dispense Refill    aspirin (ECOTRIN) 81 MG EC tablet Take 81 mg by mouth once daily.      atorvastatin (LIPITOR) 40 MG tablet Take 40 mg by mouth once daily.      clopidogreL (PLAVIX) 75 mg tablet Take 75 mg by mouth once daily.      glyBURIDE (DIABETA) 5 MG tablet Take 5 mg by mouth 2 (two) times daily.      ibuprofen (ADVIL,MOTRIN) 200 MG tablet Take 200 mg by mouth every 6 (six) hours as needed for Pain.      metFORMIN (GLUCOPHAGE) 1000 MG tablet Take 1,000 mg by mouth every morning. Take with 1/2 tablet of 500 mg(1250 mg)      metFORMIN (GLUCOPHAGE) 500 MG tablet Take 250 mg by mouth 2 (two) times a day.      metoprolol tartrate (LOPRESSOR) 100 MG tablet Take 50 mg by mouth 2 (two) times daily.      ramipriL (ALTACE) 2.5 MG capsule Take 2.5 mg by mouth every evening.         Please address this information as you see fit.  Feel free to contact us if you have any questions or require assistance.    Darlene" Chrisman  [phone]                .
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
## 2024-09-18 NOTE — NURSING
Pt arrived to floor from ED. Pt alert and oriented. Vitals taken and stable. Pt oriented to roomset up and expectations. Call light in reach, safety measures in place.

## 2024-09-18 NOTE — PROGRESS NOTES
"[facility] Medicine Progress Note    Primary Team: [facility] Hospitalist Team B  Attending Physician: Edward Hearn MD  Resident: Rupali Lan  Intern: Malinda Bolton    Subjective:      Patient with wife at bedside, information obtained from both parties. Patient with mental decline over past 8 years without preceding incident. He has had decreasing po intake over the past year. He had limited po intake this past  with no po intake Monday & Tuesday while still taking glyburide. He has been sleeping more frequently lately & changes in his gait. Wife reports patient was "talking out of his head" last night before presentation but he doesn't remember this last night, but is complaining of suprapubic abdominal pain as of this AM. Reports usually has no troubles with urination but only had minimal urination this AM.     Med rec per meds brought from home by wife: Metformin 1500 mg BID, Glyburide 5 mg BID , Plavix 75 mg daily, ASA 81 mg daily, Lipitor 40 mg daily, ramipril 2.5 mg daily, lopressor 100 mg daily; reports stool softener, unsure of which one      Objective:     Last 24 Hour Vital Signs:  BP  Min: 99/58  Max: 164/79  Temp  Av.1 °F (37.3 °C)  Min: 97.8 °F (36.6 °C)  Max: 100.4 °F (38 °C)  Pulse  Av.9  Min: 72  Max: 112  Resp  Av.2  Min: 17  Max: 28  SpO2  Av.3 %  Min: 96 %  Max: 99 %  Height  Av' 7" (170.2 cm)  Min: 5' 7" (170.2 cm)  Max: 5' 7" (170.2 cm)  Weight  Av.1 kg (170 lb)  Min: 77.1 kg (170 lb)  Max: 77.1 kg (170 lb)  I/O last 3 completed shifts:  In: 1569.7 [IV Piggyback:1569.7]  Out: 550 [Urine:550]    Physical Examination:  Physical Exam  Vitals reviewed. Chaperone present: wife at bedside.   Constitutional:       General: He is not in acute distress.     Appearance: He is not toxic-appearing.   HENT:      Head: Normocephalic.      Nose: Nose normal.      Mouth/Throat:      Mouth: Mucous membranes are moist.      Comments: Top denture in place  Eyes:      " General: No scleral icterus.     Conjunctiva/sclera: Conjunctivae normal.   Cardiovascular:      Rate and Rhythm: Normal rate and regular rhythm.      Pulses: Normal pulses.   Pulmonary:      Effort: Pulmonary effort is normal. No respiratory distress.      Breath sounds: No wheezing.   Abdominal:      Palpations: Abdomen is soft.      Tenderness: There is abdominal tenderness (suprapubic).   Musculoskeletal:      Right lower leg: No edema.      Left lower leg: No edema.   Feet:      Comments: Right pinky toe amputated, no erythema or drainage, well healed site   Dorsal inferior foot with s/p debridement, firmness of inferior foot   Skin:     General: Skin is warm and dry.      Coloration: Skin is not jaundiced or pale.   Neurological:      General: No focal deficit present.      Mental Status: He is alert and oriented to person, place, and time.      Cranial Nerves: No dysarthria or facial asymmetry.      Motor: No weakness.   Psychiatric:         Mood and Affect: Mood normal.         Behavior: Behavior normal.         Laboratory:  Laboratory Data Reviewed: yes  Pertinent Findings:  Hgb 12.4  Na 132  K 3.4   HCO3 17   Crt 1.5  GFR 49   Phos 2.9   Glucose 53 > 101    Trop 0.020  La 1.8   A1C 6.2 %  TSH 0.381   T4 0.77    Microbiology Data Reviewed: yes  Pertinent Findings:  Bcx NG 1 day    Other Results:  EKG (my interpretation): Sinus rhythm, qtc 438, inverted T waves in AvL    Radiology Data Reviewed: yes  Pertinent Findings:  CXR: mild left basilar opacities, compatible with atelectasis, developing pneumonia or aspiration.     CT Chest con & CT AP IV con, no oral:   No acute findings. Gallbladder absent suggesting interval cholecystectomy.   - wet read with left sided plaque, in setting of asbestosis exposure     Current Medications:     Infusions:       Scheduled:   aluminum-magnesium hydroxide-simethicone  30 mL Oral Once    And    LIDOcaine viscous HCl 2%  15 mL Oral Once    aspirin  81 mg Oral Daily     clopidogreL  75 mg Oral Daily    enoxparin  40 mg Subcutaneous Daily    famotidine  20 mg Oral Daily    polyethylene glycol  17 g Oral Daily        PRN:    Current Facility-Administered Medications:     aluminum-magnesium hydroxide-simethicone, 30 mL, Oral, QID PRN    dextrose 10%, 12.5 g, Intravenous, PRN    dextrose 10%, 25 g, Intravenous, PRN    glucagon (human recombinant), 1 mg, Intramuscular, PRN    glucose, 16 g, Oral, PRN    magnesium oxide, 800 mg, Oral, PRN    naloxone, 0.02 mg, Intravenous, PRN    ondansetron, 4 mg, Intravenous, Q8H PRN    sodium chloride 0.9%, 10 mL, Intravenous, Q12H PRN    Antibiotics and Day Number of Therapy:  Zosyn 9/17 once   Vanc 9/17 once    Lines and Day Number of Therapy:  PIV x2    Assessment / Plan:      Yoan Coyne is a 73 y.o. male with past medical history of non-insulin dependent T2DM, coronary artery disease with previous MI in 2011 s/p CABG & stent, hypertension, hyperlipidemia, asbestos exposure who presents to ED with complaint of generalized fatigue, confusion and urinary urgency x3 days. Patient admitted to [facility] for acute encephalopathy, persistent hypoglycemia requiring D10 gtt.     Acute Encephalopathy, resolved   Likely in setting of hypoglycemia 2/2 no po intake of glyburide, resolved after dextrose.   - Patient recently with urinary retention / urinary incontinence, shuffling gait, decline in cognitive function; NPH on differential.   - TSH, T4 with subclinical hyperthyroidism, consider repeat outpatient   - Obtain ethanol, AM cortisol   - Obtain CT head non con   - Referral to neurology for neurocognitive testing outpatient, complicated by inability to read or write    Type 2 Diabetes Mellitus - Non-Insulin Dependent   Decreased Po Intake   Hypoglycemia   - A1C 6.2 today, 7.2 three months ago   - Admission glucose 53 on CMP   - Required multiples pushes of D50  - Initiated D10 gtt, glucoses improved this AM   - Half life of glyburide is 10 hours,  can likely discontinue D10 & recheck POCT glucoses  - If still hypoglycemic with poor po intake, consider restarting D10   - Consider low dose mirtazapine to help with decreased po intake   - Discontinue glyburide on discharge   - Hold metformin while inpatient     Leukocytosis, resolved  Likely stress response in setting of hypoglycemia, low concern for infectious etiology at this time; discontinue abx   - WBC 17.32 on admission with neutrophil predominance, Procal 0.19  - Covid negative   - CT chest with atelectasis  - Repeat with resolution  - UA without pyuria  - Obtain Covid test  - Discontinue Vanc & Zosyn     Acute Urinary Retention    - Retaining 700cc on post void ultrasound   - s/p x1 I/O  - Continue post void residual ultrasound   - May require dey during admission if continued I/O cath  - Urology referral on discharge     Hyponatremia   - Na 134 on admission, eunatremic previously   - Repeat 132, likely in setting of dextrose containing fluids   - Discontinuing dextrose containing fluids   - Urine lytes    TALYA   - Admission Crt 1.5, baseline 1.1  - Likely prerenal in setting of poor po intake  - Encourage po intake  - Lytes as above, received 1L NS  - Avoid nephrotoxic medications     Hypomagnesemia   Hypokalemia  - Mag 1.1 on admission   - K low of 3.4  - Received 1g Mg and, 1x Phos NaK  - Replete prn     NAGMA  - Bicarb 16 on admission CMP   - Obtain urine lytes to calculate gap     Lactic Acidosis   - Lactic 2.4 on admission, repeat 1.3  - No further repeats     CAD s/p MI  - CABG/stent - 3 stents in 2011   - Troponin 0.020, no acute ischemic changes on EKG   - BNP 43 on admission  - Continue DAPT daily   - Consider deescalating to aspirin monotherapy     GERD  - Denies any reflux symptoms   - Minimal po intake   - Discontinue famotidine     Asbestosis Exposure   - Pleural plaque lower left lung seen on CT chest   - Follow up outpatient with PCP      VTE: Lovenox 40 daily   Diet: Diabetic   Code:  Full  Dispo: 24-48 hours pending work up & symptom improvement      Malinda Bolton,   [facility] Pediatric[facility] HO-II  [facility] Hospitalist Medicine Team B    [facility] Medicine Hospitalist Pager numbers:   [facility] Hospitalist Medicine Team A (Hilaria/Tamera): [phone]  [facility] Hospitalist Medicine Team B (Wiliam/Dhiraj):  [phone]

## 2024-09-18 NOTE — H&P
[facility] Medicine H&P Note     Admitting Team: [facility] Hospitalist Team B  Attending Physician: Edward Hearn MD  Resident: Rupali Lan MD  Intern: Malinda Bolton MD    Date of Admit: 9/17/2024    Chief Complaint     Generalized Fatigue x3 days    Subjective:      History of Present Illness:  Yoan Coyne is a 73 y.o. male with past medical history of diabetes, coronary artery disease with previous MI, hypertension, hyperlipidemia who presents to ED with complaint of generalized fatigue, confusion and urinary urgency x3 days.  His wife reports that since Sunday the patient has been sleeping a significant more amount.  He also reports decreased appetite and increased fatigue with some occasional chills.  He also reports urinary incontinence secondary to urgency where he feels the urge to go and can not get to the bathroom quick enough because of the timing of onset.  He does report a slight cough for the last week and reports some heartburn today that he usually does not get, however he reports this does not feel like his previous MI symptoms.  He denies history of heart failure, denies orthopnea, denies shortness of breath, denies sputum production, denies focal weakness, denies abdominal pain, denies nausea vomiting, denies dysuria or frequency with urination.    Of note, the patient reports having an upper respiratory syndrome several weeks ago with nasal congestion and cough. 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    Past Medical History:  Diabetes  coronary artery disease with MI  Hypertension  Hyperlipidemia    Past Surgical History:  Past Surgical History:   Procedure Laterality Date    REMOVAL-TUBE  4/12/2024    Procedure: REMOVAL-TUBE (INTRA-ABDOMINAL TUBE);  Surgeon: Wiyl Ugarte MD;  Location: [facility] OR;  Service: General;;    ROBOT-ASSISTED CHOLECYSTECTOMY N/A 4/12/2024    Procedure: ROBOTIC CHOLECYSTECTOMY;  Surgeon: Wily Ugarte MD;  Location: [facility] OR;   "Service: General;  Laterality: N/A;       Allergies:  No previous allergies    Home Medications:  Meds reviewed with patient as well as brought in bag of medications  Metformin 1500  Glyburide 500  Atorvastatin 40  Clopidogrel 75  Ramipril 2.5  Metoprolol 100  Aspirin 81  Clopidogrel 75    Family History:  No significant past family history    Social History:  Tobacco:  Never smoker  Passive Exposure:  Secondhand smoke exposure with wife who is a heavy smoker of 45 years  Alcohol: None  Illicit Drugs: None      Review of Systems:  Pertinent items are noted in HPI. All other systems reviewed and negative    Health Maintaince :   Primary Care Physician: Merline Puri MD    Immunizations:   TDap in   Flu Due (never done)  Pna (Due, never done)    Cancer Screening:  Colonoscopy: with cologuard in 2019     Objective:   Last 24 Hour Vital Signs:  BP  Min: 99/58  Max: 135/73  Temp  Av.1 °F (37.8 °C)  Min: 99.8 °F (37.7 °C)  Max: 100.4 °F (38 °C)  Pulse  Av.2  Min: 72  Max: 112  Resp  Av.3  Min: 17  Max: 28  SpO2  Av.9 %  Min: 96 %  Max: 99 %  Height  Av' 7" (170.2 cm)  Min: 5' 7" (170.2 cm)  Max: 5' 7" (170.2 cm)  Weight  Av.1 kg (170 lb)  Min: 77.1 kg (170 lb)  Max: 77.1 kg (170 lb)  Body mass index is 26.63 kg/m².  No intake/output data recorded.    Physical Examination:  General appearance: alert, appears stated age, and cooperative  Head: Normocephalic, without obvious abnormality, atraumatic  Eyes: conjunctivae/corneas clear. PERRL, EOM's intact. Fundi benign.  Throat: lips, mucosa, and tongue normal; teeth and gums normal, moist mucous membranes  Neck: no adenopathy, no carotid bruit, no JVD, supple, symmetrical, trachea midline, and thyroid not enlarged, symmetric, no tenderness/mass/nodules  Lungs:  Vesicular breath sounds bilaterally with mild crackles in the left lower lobe and slight decreased breath sounds in the left upper lobe.  Chest wall: no tenderness  Heart: regular " "rate and rhythm, S1, S2 normal, no murmur, click, rub or gallop  Abdomen: soft, non-tender; bowel sounds normal; no masses,  no organomegaly  Extremities: extremities normal, atraumatic, no cyanosis or edema, prolonged cap refill >3 seconds  Pulses: 2+ and symmetric    Laboratory:  Most Recent Data:  CBC:   Lab Results   Component Value Date    WBC 17.32 (H) 09/17/2024    HGB 14.2 09/17/2024    HCT 41.2 09/17/2024     09/17/2024    MCV 88 09/17/2024    RDW 13.0 09/17/2024     BMP:   Lab Results   Component Value Date     (L) 09/17/2024    K 3.9 09/17/2024     09/17/2024    CO2 16 (L) 09/17/2024    BUN 17 09/17/2024    CREATININE 1.4 09/17/2024    GLU 53 (L) 09/17/2024    CALCIUM 9.9 09/17/2024    MG 1.1 (L) 09/17/2024    PHOS 2.9 03/01/2024     LFTs:   Lab Results   Component Value Date    PROT 7.7 09/17/2024    ALBUMIN 3.3 (L) 09/17/2024    BILITOT 1.0 09/17/2024    AST 20 09/17/2024    ALKPHOS 70 09/17/2024    ALT 11 09/17/2024     Coags:   Lab Results   Component Value Date    INR 1.1 02/29/2024     FLP: No results found for: "CHOL", "HDL", "LDLCALC", "TRIG", "CHOLHDL"  DM:   Lab Results   Component Value Date    HGBA1C 7.2 (H) 02/29/2024    CREATININE 1.4 09/17/2024     Thyroid: No results found for: "TSH", "FREET4", "Z4DMKOH", "L3ZZHEF", "THYROIDAB"  Anemia: No results found for: "IRON", "TIBC", "FERRITIN", "CHZHDDCC35", "FOLATE"  Cardiac:   Lab Results   Component Value Date    TROPONINI <0.006 02/29/2024    BNP 43 09/17/2024     Urinalysis:   Lab Results   Component Value Date    COLORU Yellow 09/17/2024    SPECGRAV 1.020 09/17/2024    NITRITE Negative 09/17/2024    KETONESU Negative 09/17/2024    UROBILINOGEN Negative 09/17/2024    WBCUA 0 09/17/2024       Trended Lab Data:  Recent Labs   Lab 09/17/24  2156   WBC 17.32*   HGB 14.2   HCT 41.2      MCV 88   RDW 13.0   *   K 3.9      CO2 16*   BUN 17   CREATININE 1.4   GLU 53*   PROT 7.7   ALBUMIN 3.3*   BILITOT 1.0   AST " 20   ALKPHOS 70   ALT 11       Trended Cardiac Data:  Recent Labs   Lab 09/17/24  2156   BNP 43       Microbiology Data:  Awaiting blood culture results  Previous blood cultures from previous admissions negative for growth    Other Results:  EKG (my interpretation): Awaiting study    Radiology:  Imaging Results              X-Ray Chest AP Portable (Final result)  Result time 09/17/24 22:43:44      Final result by Juve De La Rosa DO (09/17/24 22:43:44)                   Impression:      Mild left basilar opacities, compatible with atelectasis, developing pneumonia or aspiration.      Electronically signed by: Juve De La Rosa  Date:    09/17/2024  Time:    22:43               Narrative:    EXAMINATION:  XR CHEST AP PORTABLE    CLINICAL HISTORY:  Sepsis;    TECHNIQUE:  Single frontal view of the chest was performed.    COMPARISON:  02/29/2024.    FINDINGS:  The lungs are well expanded.  Mild left basilar opacities.  The remaining lungs are clear.  The pleural spaces are clear. The cardiac silhouette is unremarkable. The visualized osseous structures demonstrate degenerative changes.                                       Assessment:     Yoan Coyne is a 73 y.o. male with past medical history of diabetes, coronary artery disease with MI, hypertension hyperlipidemia who presents to [facility] with wife with complaint of generalized weakness and fatigue, as well as urinary urgency x3 days.  Initial vitals significant for a pulse of 112 and a temperature 100.4°, recorded temperature of 101.4° at home per wife.  Physical exam grossly unremarkable except for prolonged capillary refill greater than 3 seconds and mild crackles at the left lower lobe.  Initial differential includes but is not limited to pneumonia or Urosepsis, CHF exacerbation, ACS(unlikely), Viral URI, gastroenteritis.    Labs significant for a white blood cell count of 17.3.  Of note lactic acid negative at 1.3 and BNP of 43.  Magnesium 1.1 an initial  blood glucose 53 improved after IV glucose repletion.  Bicarbonate 16 with a chloride of 104. Urine fairly unremarkable except 1+ protein and blood. Given this the leading differential is pneumonia, however can not rule out other sources of infection including intra-abdominal versus kidney. Plan as below.     Plan:     Sepsis? (Likely Pneumonia)  On Vanc and Zosyn  CXR with pulmonary finding on left lower lobe  Urine unremarkable  Lactic 1.8  TALYA  TALYA vs CKD  CT abdomen/pelvis to assess  Hypomagnesemia  Replete as needed  GERD  Symptomatic management  CAD with previous MI  Reports recent GERD like symptoms  EKG, Trop and BNP to rule out cardiac cause  DM  Holding Metformin  Sliding Scale Insulin  HTN  Holding home Ramipril 2.5mg secondary to concern for sepsis  Can restart if hypertensive  Persistent Hypoglycemia   On initial evaluation, replaced with D10 and D50 and PRN POCT placed  Despite repetitive doses of glucose, persistent hypoglycemia.  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

## 2024-09-18 NOTE — PLAN OF CARE
Problem: Physical Therapy  Goal: Physical Therapy Goal  Description: Goals to be met by: 10/18/2024     Patient will increase functional independence with mobility by performin. Supine to sit with Modified [place]  2. Sit to supine with Modified [place]  3. Sit to stand transfer with Modified [place] with or without appropriate AD  4. Bed to chair transfer with Modified [place] with or without appropriate AD  5. Gait  x 200 feet with Modified [place] with or without appropriate AD  6. Lower extremity exercise program 2x10 reps with independence    Outcome: Progressing   Patient evaluated; noted BP dropped from 164/79 in supine to 115/62 in standing but pt asymptomatic; notable instability with amb without AD; provided RW to complete amb back to bed; pt will benefit from cont PT to address goals established to maximize functional independence; recs TBD pending progress with therapy.

## 2024-09-18 NOTE — ED NOTES
"HPI:     Pt c/o of increased urination and fever x 3 days, pt reports he has no control over the flow, "it just comes out"    No pain on urination, no burning, reports generalized weakness, non productive cough    Pt saw he Dr recently for possible UTI but states he was not put on antibiotics  "

## 2024-09-18 NOTE — PROGRESS NOTES
VIRTUAL NURSE: Cued into patient's room. Permission received per patient to turn camera to view patient. Introduced as VN that will be working with floor nurse this shift. Educated the patient and his spouse Abi on VN's role in patient care. Plan of care reviewed. Informed that staff will round on them every 2 hours but to use call light for any other needs they may have. Also informed of fall risk and fall precautions. Patient and spouse verbalized understanding. Call light within reach; bed siderails up x2. Opportunity given for questions and questions answered. Admission assessment questions completed. Patient denies complaints or any needs at this time.     09/18/24 1600   Admission   Initial VN Admission Questions Complete   Communication Issues? None   Shift   Virtual Nurse - Patient Verbalized Approval Of Camera Use   Safety/Activity   Patient Rounds bed in low position;placement of personal items at bedside;call light in patient/parent reach;visualized patient   Safety Promotion/Fall Prevention assistive device/personal item within reach;Fall Risk reviewed with patient/family;side rails raised x 2;instructed to call staff for mobility   Positioning   Body Position position changed independently   Pain/Comfort/Sleep   Preferred Pain Scale number (Numeric Rating Pain Scale)   Pain Rating (0-10): Rest 0

## 2024-09-18 NOTE — PLAN OF CARE
Problem: Occupational Therapy  Goal: Occupational Therapy Goal  Description: Goals to be met by: 10/18/2024      Patient will increase functional independence with ADLs by performing:    UE Dressing with Modified [place].  LE Dressing with Modified [place].  Grooming while standing with Modified [place].  Toileting from toilet with Modified [place] for hygiene and clothing management.   Toilet transfer to toilet with Modified [place].  Increased functional strength to WFL for self care.  Upper extremity exercise program x10 reps per handout, with independence.     Outcome: Progressing   Pt would benefit from continued OT to address deficits in self care and functional mobility. Recommendations TBD pending progress with therapies

## 2024-09-18 NOTE — ED PROVIDER NOTES
"Emergency Department Encounter  Provider Note    Yoan Coyne  [id number]  9/17/2024    Evaluation:    History Acquisition:     Chief Complaint   Patient presents with    Altered Mental Status     Pt presents to the ED brought in by wife. Wife states  has been confused since Sunday. On today pt spiked a temp at 101.0. Wife also mentions the has been going to the bathroom more frequently then normal and when he gets up he is not able to make it to the bathroom so he has been having accidents at home. Pt is alert and oriented in triage room.        History of Present Illness:  Yoan Coyne is a 73 y.o. male who has a past medical history of Coronary artery disease, Diabetes mellitus, and Hypertension.    The patient presents to the ED due to AMS.   Patient presents with wife, who provides history.  She states the patient has been confused for the last few days.   Earlier today he was talking about "camel hair" and other nonsensical things.   He has also started running fever today.   She denies any known sick contacts. He has had a mild cough. No N/V/D, CP, abdominal pain, or urinary complaints.   She does state today he has been incontinent.   On arrival to ED, family states patient appears to be back to baseline mental status.   He denies any complaints currently.       Additional historians utilized:  Wife at bedside - see HPI    Prior medical records were reviewed:   Surgery visit 4/26 for post-op visit  Underwent robotic cholecystectomy and cholecystostomy tube placement on 4/12.  Surgery visit 3/15 for f/u cholecystostomy tube placement for cholecystitis     The patient's list of active medical history, family/social history, medications, and allergies as documented has been reviewed.     Past Medical History:   Diagnosis Date    Coronary artery disease     Diabetes mellitus     Hypertension      Past Surgical History:   Procedure Laterality Date    REMOVAL-TUBE  4/12/2024    Procedure: REMOVAL-TUBE " (INTRA-ABDOMINAL TUBE);  Surgeon: Wily Ugarte MD;  Location: [facility] OR;  Service: General;;    ROBOT-ASSISTED CHOLECYSTECTOMY N/A 4/12/2024    Procedure: ROBOTIC CHOLECYSTECTOMY;  Surgeon: Wily Ugarte MD;  Location: [facility] OR;  Service: General;  Laterality: N/A;     No family history on file.  Social History     Socioeconomic History    Marital status:    Tobacco Use    Smoking status: Never     Passive exposure: Never    Smokeless tobacco: Never     Social Determinants of Health     Financial Resource Strain: Low Risk  (3/1/2024)    Overall Financial Resource Strain (CARDIA)     Difficulty of Paying Living Expenses: Not hard at all   Food Insecurity: No Food Insecurity (3/1/2024)    Hunger Vital Sign     Worried About Running Out of Food in the Last Year: Never true     Ran Out of Food in the Last Year: Never true   Transportation Needs: No Transportation Needs (3/1/2024)    PRAPARE - Transportation     Lack of Transportation (Medical): No     Lack of Transportation (Non-Medical): No   Physical Activity: Insufficiently Active (3/1/2024)    Exercise Vital Sign     Days of Exercise per Week: 2 days     Minutes of Exercise per Session: 60 min   Stress: No Stress Concern Present (3/1/2024)    [employer] - Occupational Stress Questionnaire     Feeling of Stress : Not at all   Housing Stability: Low Risk  (3/1/2024)    Housing Stability Vital Sign     Unable to Pay for Housing in the Last Year: No     Number of Places Lived in the Last Year: 1     Unstable Housing in the Last Year: No       Medications:  Current Discharge Medication List        CONTINUE these medications which have NOT CHANGED    Details   aspirin (ECOTRIN) 81 MG EC tablet Take 81 mg by mouth once daily.      atorvastatin (LIPITOR) 40 MG tablet Take 40 mg by mouth once daily.      clopidogreL (PLAVIX) 75 mg tablet Take 75 mg by mouth once daily.      glyBURIDE (DIABETA) 5 MG tablet Take 5 mg by mouth 2 (two)  times daily.      ibuprofen (ADVIL,MOTRIN) 200 MG tablet Take 200 mg by mouth every 6 (six) hours as needed for Pain.      !! metFORMIN (GLUCOPHAGE) 1000 MG tablet Take 1,000 mg by mouth every morning. Take with 1/2 tablet of 500 mg(1250 mg)      !! metFORMIN (GLUCOPHAGE) 500 MG tablet Take 250 mg by mouth 2 (two) times a day.      metoprolol tartrate (LOPRESSOR) 100 MG tablet Take 50 mg by mouth 2 (two) times daily.      ramipriL (ALTACE) 2.5 MG capsule Take 2.5 mg by mouth every evening.       !! - Potential duplicate medications found. Please discuss with provider.          Allergies:  Review of patient's allergies indicates:  No Known Allergies    Review of Systems   Psychiatric/Behavioral:  Positive for confusion.          Physical Exam:     Initial Vitals [09/17/24 2115]   BP Pulse Resp Temp SpO2   135/73 (!) 112 19 (!) 100.4 °F (38 °C) 99 %      MAP       --         Physical Exam    Nursing note and vitals reviewed.  Constitutional: He appears well-developed and well-nourished. He is not diaphoretic. No distress.   HENT:   Head: Normocephalic and atraumatic.   Mouth/Throat: Oropharynx is clear and moist.   Eyes: EOM are normal. Pupils are equal, round, and reactive to light.   Neck: No tracheal deviation present.   Cardiovascular:  Normal rate, regular rhythm, normal heart sounds and intact distal pulses.           Pulmonary/Chest: Breath sounds normal. No stridor. No respiratory distress.   Abdominal: Abdomen is soft. He exhibits no distension and no mass. There is no abdominal tenderness.   Musculoskeletal:         General: No edema. Normal range of motion.     Neurological: He is alert and oriented to person, place, and time. He has normal strength. He is not disoriented. No cranial nerve deficit or sensory deficit. He exhibits normal muscle tone. GCS eye subscore is 4. GCS verbal subscore is 5. GCS motor subscore is 6.   Skin: Skin is warm and dry. Capillary refill takes less than 2 seconds. No rash  noted.   Psychiatric: He has a normal mood and affect. His behavior is normal. Thought content normal.         Differential Diagnoses:   Based on available information and initial assessment, Differential Diagnosis includes, but is not limited to:  Sepsis, bacteremia, UTI, pneumonia, cellulitis, abscess, indwelling line/catheter infection, cholecystitis, viral URI, gastroenteritis, viral syndrome, sinusitis, otitis media/externa, neoplasm, drug reaction, serotonin syndrome, intoxication/withdrawal syndrome.      ED Management:   Procedures    Orders Placed This Encounter    Blood culture x two cultures. Draw prior to antibiotics.    X-Ray Chest AP Portable    CT Chest With Contrast    CT Abdomen Pelvis With IV Contrast Routine Oral Contrast    CT Head Without Contrast    CBC auto differential    Comprehensive metabolic panel    Lactic acid, plasma #2    Urinalysis, Reflex to Urine Culture Urine, Clean Catch    Lactic acid, plasma #1    Procalcitonin    Brain natriuretic peptide    Magnesium    COVID-19 Rapid Screening    Urinalysis Microscopic    TSH    Lactic acid, plasma    Troponin I    Hemoglobin A1c    CBC with Automated Differential    Magnesium    Phosphorus    T4, Free    Legionella antigen, urine    SARS-Cov2 (COVID) with FLU/RSV by PCR    Ethanol    Cortisol, 8AM    Comprehensive metabolic panel    Sodium, urine, random    Potassium, urine, random    Chloride, urine, random    Sodium, urine, random    Basic metabolic panel    Ambulatory referral/consult to Neurology    Ambulatory referral/consult to Urology    Diet diabetic 2000 Calorie; Standard Tray    ED Preference List Used to Initiate Sepsis Orders    Cardiac Monitoring - Adult    Strict intake and output    Vital signs    Bladder scan    Notify Physician    Place sequential compression device    Recheck Blood Glucose:    If any glucose result is less than 50 or greater than 400:    If 2nd result is less than 50 or greater than 400:    If glucose  greater than 400 mg/dL treat per correction scale.  If glucose remains elevated above 400 mg/dL at next scheduled check, notify provider    Re-check Blood Glucose    Measure post void residual    Full code    OT evaluate and treat    PT evaluate and treat    POCT Venous Blood Gas - Lactate #1    POCT Venous Blood Gas    POCT Venous Blood Gas    EKG 12-lead    EKG 12-lead    EKG 12-lead    Saline lock IV    Saline lock IV    Possible Hospitalization    Admit to Inpatient    POCT glucose    POCT glucose    POCT glucose    POCT glucose    POCT glucose    POCT glucose    POCT glucose    POCT glucose    POCT glucose    POCT glucose    POCT glucose    sodium chloride 0.9% bolus 1,000 mL 1,000 mL    piperacillin-tazobactam (ZOSYN) 4.5 g in D5W 100 mL IVPB (MB+)    vancomycin 2 g in dextrose 5 % 500 mL IVPB    acetaminophen tablet 650 mg    magnesium sulfate 2g in water 50mL IVPB (premix)    dextrose 10 % infusion    sodium chloride 0.9% flush 10 mL    glucose chewable tablet 16 g    glucagon (human recombinant) injection 1 mg    enoxaparin injection 40 mg    polyethylene glycol packet 17 g    ondansetron injection 4 mg    dextrose 10% bolus 250 mL 250 mL    aspirin EC tablet 81 mg    clopidogreL tablet 75 mg    lactated ringers bolus 500 mL    iohexoL (OMNIPAQUE 350) injection 100 mL    iohexoL (OMNIPAQUE 350) injection 1 mL    dextrose 50% injection 25 g    potassium, sodium phosphates 280-160-250 mg packet 1 packet    dextrose 10% bolus 125 mL 125 mL    IP VTE HIGH RISK PATIENT    Bed rest    Progressive Mobility Protocol (mobilize patient to their highest level of functioning at least twice daily)          EKG:   EKG interpretation by ED attending physician:  Sinus tachycardia, rate 103, no ST changes, no ischemia, normal intervals.  Compared with prior EKG dated 02/2024, rate has increased, otherwise grossly stable without significant change.      Labs:     Labs Reviewed   CBC W/ AUTO DIFFERENTIAL - Abnormal        Result Value    WBC 17.32 (*)     RBC 4.67      Hemoglobin 14.2      Hematocrit 41.2      MCV 88      MCH 30.4      MCHC 34.5      RDW 13.0      Platelets 241      MPV 9.3      Immature Granulocytes CANCELED      Immature Grans (Abs) CANCELED      Lymph # CANCELED      Mono # CANCELED      Eos # CANCELED      Baso # CANCELED      nRBC 0      Gran % 79.0 (*)     Lymph % 12.0 (*)     Mono % 9.0      Eosinophil % 0.0      Basophil % 0.0      Platelet Estimate Appears normal      Differential Method Manual     COMPREHENSIVE METABOLIC PANEL - Abnormal    Sodium 134 (*)     Potassium 3.9      Chloride 104      CO2 16 (*)     Glucose 53 (*)     BUN 17      Creatinine 1.4      Calcium 9.9      Total Protein 7.7      Albumin 3.3 (*)     Total Bilirubin 1.0      Alkaline Phosphatase 70      AST 20      ALT 11      eGFR 54 (*)     Anion Gap 14     URINALYSIS, REFLEX TO URINE CULTURE - Abnormal    Specimen UA Urine, Clean Catch      Color, UA Yellow      Appearance, UA Hazy (*)     pH, UA 6.0      Specific Gravity, UA 1.020      Protein, UA 1+ (*)     Glucose, UA Negative      Ketones, UA Negative      Bilirubin (UA) Negative      Occult Blood UA 1+ (*)     Nitrite, UA Negative      Urobilinogen, UA Negative      Leukocytes, UA Negative      Narrative:     Specimen Source->Urine   LACTIC ACID, PLASMA - Abnormal    Lactate (Lactic Acid) 2.4 (*)    MAGNESIUM - Abnormal    Magnesium 1.1 (*)    TSH - Abnormal    TSH 0.381 (*)    BASIC METABOLIC PANEL - Abnormal    Sodium 132 (*)     Potassium 3.4 (*)     Chloride 103      CO2 17 (*)     Glucose 101      BUN 16      Creatinine 1.5 (*)     Calcium 9.1      Anion Gap 12      eGFR 49 (*)    HEMOGLOBIN A1C - Abnormal    Hemoglobin A1C 6.2 (*)     Estimated Avg Glucose 131     CBC W/ AUTO DIFFERENTIAL - Abnormal    WBC 11.39      RBC 4.09 (*)     Hemoglobin 12.4 (*)     Hematocrit 37.2 (*)     MCV 91      MCH 30.3      MCHC 33.3      RDW 12.7      Platelets 193      MPV 9.0 (*)      Immature Granulocytes 0.4      Gran # (ANC) 7.9 (*)     Immature Grans (Abs) 0.05 (*)     Lymph # 1.7      Mono # 1.7 (*)     Eos # 0.1      Baso # 0.03      nRBC 0      Gran % 69.3      Lymph % 14.7 (*)     Mono % 14.9      Eosinophil % 0.4      Basophil % 0.3      Differential Method Automated     PHOSPHORUS - Abnormal    Phosphorus 2.6 (*)    CORTISOL, 8AM - Abnormal    Cortisol, 8 AM 23.80 (*)    POCT GLUCOSE - Abnormal    POCT Glucose 43 (*)    POCT GLUCOSE - Abnormal    POCT Glucose 42 (*)    POCT GLUCOSE - Abnormal    POCT Glucose 23 (*)    POCT GLUCOSE - Abnormal    POCT Glucose 23 (*)    POCT GLUCOSE - Abnormal    POCT Glucose 203 (*)    POCT GLUCOSE - Abnormal    POCT Glucose 38 (*)    POCT GLUCOSE - Abnormal    POCT Glucose 121 (*)    POCT GLUCOSE - Abnormal    POCT Glucose 219 (*)    CULTURE, BLOOD    Blood Culture, Routine No Growth to date      Narrative:     Aerobic and anaerobic   CULTURE, BLOOD    Blood Culture, Routine No Growth to date      Narrative:     Aerobic and anaerobic   PROCALCITONIN    Procalcitonin 0.19     B-TYPE NATRIURETIC PEPTIDE    BNP 43     SARS-COV-2 RNA AMPLIFICATION, QUAL    SARS-CoV-2 RNA, Amplification, Qual Negative     URINALYSIS MICROSCOPIC    RBC, UA 2      WBC, UA 0      Bacteria None      Squam Epithel, UA 0      Hyaline Casts, UA 0      Microscopic Comment SEE COMMENT      Narrative:     Specimen Source->Urine   LACTIC ACID, PLASMA    Lactate (Lactic Acid) 1.3     LACTIC ACID, PLASMA    Lactate (Lactic Acid) 1.8     TROPONIN I    Troponin I 0.020     MAGNESIUM    Magnesium 1.8     T4, FREE    Free T4 0.77     ALCOHOL,MEDICAL (ETHANOL)    Alcohol, Serum <10      Narrative:     Collection has been rescheduled by KIRT at 09/18/2024 07:47 Reason:   Patient unavailable pt is in bathroom.   LEGIONELLA ANTIGEN, URINE RANDOM   POCT GLUCOSE    POCT Glucose 94     POCT GLUCOSE MONITORING CONTINUOUS     Independent review of the labs ordered include:   See ED course    Imaging:      Imaging Results              CT Head Without Contrast (Final result)  Result time 09/18/24 15:53:24      Final result by Lawrence Stovall MD (09/18/24 15:53:24)                   Impression:      No acute intracranial process.      Electronically signed by: Lawrence Stovall  Date:    09/18/2024  Time:    15:53               Narrative:    EXAMINATION:  CT HEAD WITHOUT CONTRAST    CLINICAL HISTORY:  Mental status change, unknown cause; Encephalopathy, unspecified    TECHNIQUE:  Low dose axial CT images obtained throughout the head without intravenous contrast. Sagittal and coronal reconstructions were performed.    COMPARISON:  09/18/2021    FINDINGS:  Intracranial compartment:    Ventricles and sulci are normal in size for age without evidence of hydrocephalus. No extra-axial blood or fluid collections.    Moderate involutional changes with mild probable chronic microvascular ischemic changes in the periventricular white matter.  No parenchymal mass, hemorrhage, edema or major vascular distribution infarct.    Skull/extracranial contents (limited evaluation): No fracture. Mastoid air cells and paranasal sinuses are essentially clear.                                       CT Abdomen Pelvis With IV Contrast NO Oral Contrast (Final result)  Result time 09/18/24 05:07:39      Final result by Hiram Kirkpatrick MD (09/18/24 05:07:39)                   Impression:      No acute findings.    Gallbladder absent suggesting interval cholecystectomy.      Electronically signed by: Hiram Kirkpatrick MD  Date:    09/18/2024  Time:    05:07               Narrative:    EXAMINATION:  CT CHEST WITH CONTRAST; CT ABDOMEN PELVIS WITH IV CONTRAST    CLINICAL HISTORY:  Sepsis;; Abdominal abscess/infection suspected;As well, painless hematuria, to assess pylenephritis vs. intra-abdominal abscess/renal mass;    TECHNIQUE:  Routine axial CT images of the chest, abdomen and pelvis were obtained after administration 100cc of IV Omnipaque 350.   .  Coronal and Sagittal reformatted images were also obtained.    COMPARISON:  02/29/2024 CT abdomen pelvis.    FINDINGS:  Heart: Normal in Size.  No pericardial effusion.    Lungs: Dependent atelectasis left base.  Large calcified granuloma posteromedial left base, similar compared to prior.    Liver: Normal in size and attenuation, with no focal hepatic lesions.    Gallbladder: Absent.    Bile ducts: No evidence of dilated ducts.    Pancreas: No mass or peripancreatic fat stranding.    Spleen: Normal.    Adrenals: Normal.    GI Tract/ Mesentery: No evidence of bowel obstruction or inflammation. Small hiatal hernia.    Kidneys/ Ureters: Normal in size and location. No hydronephrosis or nephrolithiasis. No ureteral dilatation.    Bladder: No evidence of wall thickening.    Reproductive organs: Dystrophic prostatic calcifications, similar to prior.    Retroperitoneum: No significant adenopathy.    Peritoneal space: No ascites. No free air.    Abdominal wall: Normal.    Vasculature: No significant atherosclerosis or aneurysm of the aorta.    Bones: No acute fracture. Age-appropriate degenerative changes.                                       CT Chest With Contrast (Final result)  Result time 09/18/24 05:07:39      Final result by Hiram Kirkpatrick MD (09/18/24 05:07:39)                   Impression:      No acute findings.    Gallbladder absent suggesting interval cholecystectomy.      Electronically signed by: Hiram Kirkpatrick MD  Date:    09/18/2024  Time:    05:07               Narrative:    EXAMINATION:  CT CHEST WITH CONTRAST; CT ABDOMEN PELVIS WITH IV CONTRAST    CLINICAL HISTORY:  Sepsis;; Abdominal abscess/infection suspected;As well, painless hematuria, to assess pylenephritis vs. intra-abdominal abscess/renal mass;    TECHNIQUE:  Routine axial CT images of the chest, abdomen and pelvis were obtained after administration 100cc of IV Omnipaque 350.  .  Coronal and Sagittal reformatted images were also  obtained.    COMPARISON:  02/29/2024 CT abdomen pelvis.    FINDINGS:  Heart: Normal in Size.  No pericardial effusion.    Lungs: Dependent atelectasis left base.  Large calcified granuloma posteromedial left base, similar compared to prior.    Liver: Normal in size and attenuation, with no focal hepatic lesions.    Gallbladder: Absent.    Bile ducts: No evidence of dilated ducts.    Pancreas: No mass or peripancreatic fat stranding.    Spleen: Normal.    Adrenals: Normal.    GI Tract/ Mesentery: No evidence of bowel obstruction or inflammation. Small hiatal hernia.    Kidneys/ Ureters: Normal in size and location. No hydronephrosis or nephrolithiasis. No ureteral dilatation.    Bladder: No evidence of wall thickening.    Reproductive organs: Dystrophic prostatic calcifications, similar to prior.    Retroperitoneum: No significant adenopathy.    Peritoneal space: No ascites. No free air.    Abdominal wall: Normal.    Vasculature: No significant atherosclerosis or aneurysm of the aorta.    Bones: No acute fracture. Age-appropriate degenerative changes.                                       X-Ray Chest AP Portable (Final result)  Result time 09/17/24 22:43:44      Final result by Juve De La Rosa DO (09/17/24 22:43:44)                   Impression:      Mild left basilar opacities, compatible with atelectasis, developing pneumonia or aspiration.      Electronically signed by: Juve De La Rosa  Date:    09/17/2024  Time:    22:43               Narrative:    EXAMINATION:  XR CHEST AP PORTABLE    CLINICAL HISTORY:  Sepsis;    TECHNIQUE:  Single frontal view of the chest was performed.    COMPARISON:  02/29/2024.    FINDINGS:  The lungs are well expanded.  Mild left basilar opacities.  The remaining lungs are clear.  The pleural spaces are clear. The cardiac silhouette is unremarkable. The visualized osseous structures demonstrate degenerative changes.                                         Medications Given:      Medications   sodium chloride 0.9% flush 10 mL (has no administration in time range)   glucose chewable tablet 16 g (has no administration in time range)   glucagon (human recombinant) injection 1 mg (has no administration in time range)   enoxaparin injection 40 mg (40 mg Subcutaneous Given 9/18/24 1649)   polyethylene glycol packet 17 g (17 g Oral Not Given 9/18/24 0900)   ondansetron injection 4 mg (has no administration in time range)   dextrose 10% bolus 250 mL 250 mL (0 mLs Intravenous Stopped 9/18/24 0320)   aspirin EC tablet 81 mg (81 mg Oral Given 9/18/24 0907)   clopidogreL tablet 75 mg (75 mg Oral Given 9/18/24 0907)   dextrose 10% bolus 125 mL 125 mL (has no administration in time range)   sodium chloride 0.9% bolus 1,000 mL 1,000 mL (0 mLs Intravenous Stopped 9/17/24 2309)   piperacillin-tazobactam (ZOSYN) 4.5 g in D5W 100 mL IVPB (MB+) (0 g Intravenous Stopped 9/17/24 2243)   vancomycin 2 g in dextrose 5 % 500 mL IVPB (0 mg Intravenous Stopped 9/18/24 0050)   acetaminophen tablet 650 mg (650 mg Oral Given 9/17/24 2218)   magnesium sulfate 2g in water 50mL IVPB (premix) (0 g Intravenous Stopped 9/18/24 0202)   dextrose 10 % infusion (0 mL/hr Intravenous Stopped 9/18/24 0202)   lactated ringers bolus 500 mL (0 mLs Intravenous Stopped 9/18/24 0420)   iohexoL (OMNIPAQUE 350) injection 100 mL (100 mLs Intravenous Given 9/18/24 0256)   iohexoL (OMNIPAQUE 350) injection 1 mL (100 mLs Intravenous Given 9/18/24 0257)   dextrose 50% injection 25 g (25 g Intravenous Given 9/18/24 0316)   potassium, sodium phosphates 280-160-250 mg packet 1 packet (1 packet Oral Given 9/18/24 0907)        Medical Decision Making:    Additional Consideration:   Additional testing considered during clinical course: none    Social determinants of health considered during development of treatment plan include: poor access to care    Current co-morbidities considered which impacted clinical decision making: HTN, DM, CAD,  HLD    Case discussed with additional provider: [facility], Dr. Pickard, will admit for further management of PNA, acute encephalopathy     ED Course as of 09/18/24 2035   Tue Sep 17, 2024   2118 SpO2: 99 % [SS]   2118 Resp: 19 [SS]   2118 Pulse(!): 112 [SS]   2118 Temp(!): 100.4 °F (38 °C) [SS]   2118 BP: 135/73  Patient is a 73 y.o. male presenting to ED with AMS.  Initial vital signs concerning for SIRS/sepsis due to fever and tachycardia.  Sepsis order set utilized. Will obtain infectious evaluation including labs, UA, lactate, blood cultures, CXR.  IVF and broad-spectrum ABX initiated.  Will continue to monitor closely and reassess.     [SS]   2329 CBC auto differential(!)  Leukocytosis  [SS]   2330 Magnesium(!)  Low, will replace IV [SS]   2330 Lactic acid, plasma #1(!)  Elevated, IVF given, will trend [SS]   2330 POCT Venous Blood Gas  POC lactate normal  [SS]   2330 Comprehensive metabolic panel(!)  Hypoglycemic, will give IV glucose [SS]   2330 Brain natriuretic peptide  WNL [SS]   2352 POCT glucose  Improved  [SS]   2352 Procalcitonin  WNL [SS]   2352 COVID-19 Rapid Screening  Negative  [SS]      ED Course User Index  [SS] Beto Rivas MD            Medical Decision Making  Problems Addressed:  Acute encephalopathy: acute illness or injury  Fever, unspecified fever cause: acute illness or injury  Hypoglycemia: acute illness or injury  Hypomagnesemia: acute illness or injury  Left lower lobe pneumonia: acute illness or injury  Pneumonia of left lower lobe due to infectious organism: acute illness or injury  Sepsis, due to unspecified organism, unspecified whether acute organ dysfunction present: acute illness or injury    Amount and/or Complexity of Data Reviewed  Independent Historian: spouse  External Data Reviewed: notes.  Labs: ordered. Decision-making details documented in ED Course.  Radiology: ordered and independent interpretation performed. Decision-making details documented in ED  Course.  ECG/medicine tests: ordered and independent interpretation performed. Decision-making details documented in ED Course.    Risk  OTC drugs.  Prescription drug management.  Decision regarding hospitalization.  Diagnosis or treatment significantly limited by social determinants of health.    Critical Care  Total time providing critical care: 45 minutes      This patient does have evidence of infective focus  My overall impression is sepsis.  Source: Respiratory  Antibiotics given-   Antibiotics (72h ago, onward)      None          Latest lactate reviewed-  Recent Labs   Lab 09/17/24  2148 09/17/24  2156 09/18/24  0354   LACTATE  --    < > 1.8   POCLAC 1.9  --   --     < > = values in this interval not displayed.     Organ dysfunction indicated by  none    Fluid challenge Not needed - patient is not hypotensive      Post- resuscitation assessment Yes Perfusion exam was performed within 6 hours of septic shock presentation after bolus shows Adequate tissue perfusion assessed by non-invasive monitoring       Will Not start Pressors- Levophed for MAP of 65  Source control achieved by: IV ABX        Clinical Impression:       ICD-10-CM ICD-9-CM   1. Hypoglycemia  E16.2 251.2   2. Acute encephalopathy  G93.40 348.30   3. Fever, unspecified fever cause  R50.9 780.60   4. Pneumonia of left lower lobe due to infectious organism  J18.9 486   5. Hypomagnesemia  E83.42 275.2   6. Sepsis, due to unspecified organism, unspecified whether acute organ dysfunction present  A41.9 038.9     995.91   7. Left lower lobe pneumonia  J18.9 486   8. Acute urinary retention  R33.8 788.29   9. Cognitive decline  R41.89 294.9   10. Hyponatremia  E87.1 276.1         Follow-up Information    None          ED Disposition Condition    Admit Stable              On re-evaluation, the patient's status has improved.  At this time, I believe the patient should be admitted to the hospital for further evaluation and management.  The consulting  physician/team agrees with plan and will admit under their service.   The patient and family were updated with test results, overall impression, and further plan of care. All questions were answered.       Beto Rivas MD  09/18/24 2033
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
## 2024-09-19 LAB
ALBUMIN SERPL BCP-MCNC: 2.7 G/DL (ref 3.5–5.2)
ALP SERPL-CCNC: 62 U/L (ref 55–135)
ALT SERPL W/O P-5'-P-CCNC: 17 U/L (ref 10–44)
ANION GAP SERPL CALC-SCNC: 10 MMOL/L (ref 8–16)
AST SERPL-CCNC: 25 U/L (ref 10–40)
BASOPHILS # BLD AUTO: 0.04 K/UL (ref 0–0.2)
BASOPHILS NFR BLD: 0.4 % (ref 0–1.9)
BILIRUB SERPL-MCNC: 0.7 MG/DL (ref 0.1–1)
BUN SERPL-MCNC: 20 MG/DL (ref 8–23)
CALCIUM SERPL-MCNC: 9.1 MG/DL (ref 8.7–10.5)
CHLORIDE SERPL-SCNC: 106 MMOL/L (ref 95–110)
CO2 SERPL-SCNC: 17 MMOL/L (ref 23–29)
CREAT SERPL-MCNC: 1.9 MG/DL (ref 0.5–1.4)
DIFFERENTIAL METHOD BLD: ABNORMAL
EOSINOPHIL # BLD AUTO: 0.1 K/UL (ref 0–0.5)
EOSINOPHIL NFR BLD: 1.4 % (ref 0–8)
ERYTHROCYTE [DISTWIDTH] IN BLOOD BY AUTOMATED COUNT: 13.1 % (ref 11.5–14.5)
EST. GFR  (NO RACE VARIABLE): 37 ML/MIN/1.73 M^2
GLUCOSE SERPL-MCNC: 244 MG/DL (ref 70–110)
HCT VFR BLD AUTO: 35.8 % (ref 40–54)
HGB BLD-MCNC: 12.2 G/DL (ref 14–18)
IMM GRANULOCYTES # BLD AUTO: 0.02 K/UL (ref 0–0.04)
IMM GRANULOCYTES NFR BLD AUTO: 0.2 % (ref 0–0.5)
LYMPHOCYTES # BLD AUTO: 1.7 K/UL (ref 1–4.8)
LYMPHOCYTES NFR BLD: 17.6 % (ref 18–48)
MAGNESIUM SERPL-MCNC: 1.6 MG/DL (ref 1.6–2.6)
MCH RBC QN AUTO: 29.8 PG (ref 27–31)
MCHC RBC AUTO-ENTMCNC: 34.1 G/DL (ref 32–36)
MCV RBC AUTO: 88 FL (ref 82–98)
MONOCYTES # BLD AUTO: 1.9 K/UL (ref 0.3–1)
MONOCYTES NFR BLD: 20 % (ref 4–15)
NEUTROPHILS # BLD AUTO: 5.7 K/UL (ref 1.8–7.7)
NEUTROPHILS NFR BLD: 60.4 % (ref 38–73)
NRBC BLD-RTO: 0 /100 WBC
OHS QRS DURATION: 74 MS
OHS QRS DURATION: 84 MS
OHS QTC CALCULATION: 421 MS
OHS QTC CALCULATION: 458 MS
PHOSPHATE SERPL-MCNC: 2.9 MG/DL (ref 2.7–4.5)
PLATELET # BLD AUTO: 202 K/UL (ref 150–450)
PMV BLD AUTO: 9.5 FL (ref 9.2–12.9)
POCT GLUCOSE: 229 MG/DL (ref 70–110)
POCT GLUCOSE: 247 MG/DL (ref 70–110)
POCT GLUCOSE: 252 MG/DL (ref 70–110)
POCT GLUCOSE: 265 MG/DL (ref 70–110)
POTASSIUM SERPL-SCNC: 4 MMOL/L (ref 3.5–5.1)
PROT SERPL-MCNC: 6.3 G/DL (ref 6–8.4)
RBC # BLD AUTO: 4.09 M/UL (ref 4.6–6.2)
SODIUM SERPL-SCNC: 133 MMOL/L (ref 136–145)
WBC # BLD AUTO: 9.41 K/UL (ref 3.9–12.7)

## 2024-09-19 PROCEDURE — 25000003 PHARM REV CODE 250

## 2024-09-19 PROCEDURE — 84100 ASSAY OF PHOSPHORUS: CPT

## 2024-09-19 PROCEDURE — 25000003 PHARM REV CODE 250: Performed by: INTERNAL MEDICINE

## 2024-09-19 PROCEDURE — 85025 COMPLETE CBC W/AUTO DIFF WBC: CPT

## 2024-09-19 PROCEDURE — 83735 ASSAY OF MAGNESIUM: CPT

## 2024-09-19 PROCEDURE — 51798 US URINE CAPACITY MEASURE: CPT

## 2024-09-19 PROCEDURE — 63600175 PHARM REV CODE 636 W HCPCS

## 2024-09-19 PROCEDURE — 97530 THERAPEUTIC ACTIVITIES: CPT | Mod: CQ

## 2024-09-19 PROCEDURE — 80053 COMPREHEN METABOLIC PANEL: CPT

## 2024-09-19 PROCEDURE — 11000001 HC ACUTE MED/SURG PRIVATE ROOM

## 2024-09-19 PROCEDURE — 99222 1ST HOSP IP/OBS MODERATE 55: CPT | Mod: ,,, | Performed by: UROLOGY

## 2024-09-19 PROCEDURE — 97116 GAIT TRAINING THERAPY: CPT | Mod: CQ

## 2024-09-19 PROCEDURE — 36415 COLL VENOUS BLD VENIPUNCTURE: CPT

## 2024-09-19 RX ORDER — CIPROFLOXACIN 500 MG/1
500 TABLET ORAL EVERY 12 HOURS
Status: DISCONTINUED | OUTPATIENT
Start: 2024-09-19 | End: 2024-09-20

## 2024-09-19 RX ORDER — CIPROFLOXACIN 500 MG/1
500 TABLET ORAL EVERY 24 HOURS
Status: DISCONTINUED | OUTPATIENT
Start: 2024-09-19 | End: 2024-09-19

## 2024-09-19 RX ORDER — ACETAMINOPHEN 325 MG/1
650 TABLET ORAL EVERY 6 HOURS PRN
Status: DISCONTINUED | OUTPATIENT
Start: 2024-09-19 | End: 2024-09-20 | Stop reason: HOSPADM

## 2024-09-19 RX ORDER — TAMSULOSIN HYDROCHLORIDE 0.4 MG/1
0.4 CAPSULE ORAL DAILY
Status: DISCONTINUED | OUTPATIENT
Start: 2024-09-19 | End: 2024-09-20 | Stop reason: HOSPADM

## 2024-09-19 RX ORDER — MAGNESIUM SULFATE HEPTAHYDRATE 40 MG/ML
2 INJECTION, SOLUTION INTRAVENOUS ONCE
Status: COMPLETED | OUTPATIENT
Start: 2024-09-19 | End: 2024-09-19

## 2024-09-19 RX ORDER — MUPIROCIN 20 MG/G
OINTMENT TOPICAL 2 TIMES DAILY
Status: DISCONTINUED | OUTPATIENT
Start: 2024-09-19 | End: 2024-09-20 | Stop reason: HOSPADM

## 2024-09-19 RX ADMIN — TAMSULOSIN HYDROCHLORIDE 0.4 MG: 0.4 CAPSULE ORAL at 11:09

## 2024-09-19 RX ADMIN — CIPROFLOXACIN 500 MG: 500 TABLET ORAL at 01:09

## 2024-09-19 RX ADMIN — MUPIROCIN: 20 OINTMENT TOPICAL at 08:09

## 2024-09-19 RX ADMIN — INSULIN ASPART 3 UNITS: 100 INJECTION, SOLUTION INTRAVENOUS; SUBCUTANEOUS at 06:09

## 2024-09-19 RX ADMIN — CIPROFLOXACIN 500 MG: 500 TABLET ORAL at 08:09

## 2024-09-19 RX ADMIN — MAGNESIUM SULFATE HEPTAHYDRATE 2 G: 40 INJECTION, SOLUTION INTRAVENOUS at 11:09

## 2024-09-19 RX ADMIN — ACETAMINOPHEN 650 MG: 325 TABLET ORAL at 11:09

## 2024-09-19 RX ADMIN — INSULIN ASPART 1 UNITS: 100 INJECTION, SOLUTION INTRAVENOUS; SUBCUTANEOUS at 08:09

## 2024-09-19 RX ADMIN — CLOPIDOGREL BISULFATE 75 MG: 75 TABLET ORAL at 09:09

## 2024-09-19 RX ADMIN — ASPIRIN 81 MG: 81 TABLET, COATED ORAL at 09:09

## 2024-09-19 RX ADMIN — INSULIN ASPART 2 UNITS: 100 INJECTION, SOLUTION INTRAVENOUS; SUBCUTANEOUS at 07:09

## 2024-09-19 RX ADMIN — ACETAMINOPHEN 650 MG: 325 TABLET ORAL at 08:09

## 2024-09-19 RX ADMIN — INSULIN ASPART 3 UNITS: 100 INJECTION, SOLUTION INTRAVENOUS; SUBCUTANEOUS at 11:09

## 2024-09-19 RX ADMIN — ENOXAPARIN SODIUM 40 MG: 40 INJECTION SUBCUTANEOUS at 06:09

## 2024-09-19 NOTE — PROGRESS NOTES
[facility] Medicine Progress Note    Primary Team: [facility] [place] Hospitalist Team B  Attending Physician: Edward Hearn MD  Resident: Rupali Lan  Intern: Malinda Bolton    Subjective:      Patient required x2 straight caths yesterderay / overnight due to post void residuals of 700cc. Patient was febrile one time overnight 100.8, no tylenol or antibiotics given. Patient with headache this AM. Patient's family requesting documentation of pleural plaque on imaging, discussed that information charts are available on my chart.      Objective:     Last 24 Hour Vital Signs:  BP  Min: 95/54  Max: 164/79  Temp  Av.8 °F (37.1 °C)  Min: 97.8 °F (36.6 °C)  Max: 100.8 °F (38.2 °C)  Pulse  Av.6  Min: 100  Max: 116  Resp  Av.1  Min: 17  Max: 23  SpO2  Av.5 %  Min: 97 %  Max: 100 %  Weight  Av.6 kg (162 lb 4.1 oz)  Min: 73.6 kg (162 lb 4.1 oz)  Max: 73.6 kg (162 lb 4.1 oz)  I/O last 3 completed shifts:  In: 2119.7 [P.O.:550; IV Piggyback:1569.7]  Out: 3248 [Urine:3248]    Physical Examination:  Physical Exam  Vitals reviewed. Chaperone present: wife at bedside.   Constitutional:       General: He is not in acute distress.     Appearance: He is not toxic-appearing.   HENT:      Head: Normocephalic.      Nose: Nose normal.      Mouth/Throat:      Mouth: Mucous membranes are moist.      Comments: Top denture in place  Eyes:      General: No scleral icterus.     Conjunctiva/sclera: Conjunctivae normal.   Cardiovascular:      Rate and Rhythm: Normal rate and regular rhythm.      Pulses: Normal pulses.   Pulmonary:      Effort: Pulmonary effort is normal. No respiratory distress.      Breath sounds: No wheezing.   Abdominal:      Palpations: Abdomen is soft.      Tenderness: There is no abdominal tenderness.   Musculoskeletal:      Right lower leg: No edema.      Left lower leg: No edema.   Feet:      Comments: Right pinky toe amputated, no erythema or drainage, well healed site   Dorsal inferior foot with s/p  debridement, firmness of inferior foot   Skin:     General: Skin is warm and dry.      Coloration: Skin is not jaundiced or pale.   Neurological:      General: No focal deficit present.      Mental Status: He is alert and oriented to person, place, and time.      Cranial Nerves: No dysarthria or facial asymmetry.      Motor: No weakness.   Psychiatric:         Mood and Affect: Mood normal.         Behavior: Behavior normal.         Laboratory:  Laboratory Data Reviewed: yes  Pertinent Findings:  Hgb 12.4 > 12.2  Na 132 > 133  K 3.4   HCO3 17   Crt 1.5 > 1.9   GFR 49   Phos 2.9   Glucose 53 > 101 > 244    Trop 0.020  La 1.8   A1C 6.2 %  TSH 0.381   T4 0.77    Microbiology Data Reviewed: yes  Pertinent Findings:  Bcx NG 1 day    Other Results:  EKG (my interpretation): Sinus rhythm, qtc 438, inverted T waves in AvL    Radiology Data Reviewed: yes  Pertinent Findings:  CXR: mild left basilar opacities, compatible with atelectasis, developing pneumonia or aspiration.     CT Chest con & CT AP IV con, no oral:   No acute findings. Gallbladder absent suggesting interval cholecystectomy.   - wet read with left sided plaque, in setting of asbestosis exposure     CT Head: No acute intracranial process; ventricles & sulci normal size for age, no hydrocephalus, chronic microvascular changes.     Current Medications:     Infusions:       Scheduled:   aspirin  81 mg Oral Daily    clopidogreL  75 mg Oral Daily    enoxparin  40 mg Subcutaneous Daily    polyethylene glycol  17 g Oral Daily        PRN:    Current Facility-Administered Medications:     dextrose 10%, 12.5 g, Intravenous, PRN    dextrose 10%, 12.5 g, Intravenous, PRN    dextrose 10%, 25 g, Intravenous, PRN    dextrose 10%, 25 g, Intravenous, PRN    glucagon (human recombinant), 1 mg, Intramuscular, PRN    glucose, 16 g, Oral, PRN    insulin aspart U-100, 0-5 Units, Subcutaneous, QID (AC + HS) PRN    ondansetron, 4 mg, Intravenous, Q8H PRN    sodium chloride 0.9%, 10 mL,  Intravenous, Q12H PRN    Antibiotics and Day Number of Therapy:  Zosyn 9/17 once   Vanc 9/17 once    Lines and Day Number of Therapy:  PIV x2    Assessment / Plan:      Yoan Coyne is a 73 y.o. male with past medical history of non-insulin dependent T2DM, coronary artery disease with previous MI in 2011 s/p CABG & stent, hypertension, hyperlipidemia, asbestos exposure who presents to ED with complaint of generalized fatigue, confusion and urinary urgency x3 days. Patient admitted to [facility] for acute encephalopathy, persistent hypoglycemia requiring D10 gtt. Hypoglycemia & encephalopathy 2/2 no po intake while taking glyburide, now off D10 gtt, maintaining glucoses. Now with urinary retention requiring I/O, possible prostatitis etiology.     Acute Encephalopathy, resolved   Likely in setting of hypoglycemia 2/2 no po intake of glyburide, resolved after dextrose.   - Patient recently with urinary retention / urinary incontinence, shuffling gait, decline in cognitive function; NPH on differential, now ruled out with CT head.   - TSH, T4 with subclinical hyperthyroidism, consider repeat outpatient   - Ethanol <3, AM cortisol 23.8  - CT head without acute intracranial process.   - Referral to neurology for neurocognitive testing outpatient, complicated by inability to read or write    Type 2 Diabetes Mellitus - Non-Insulin Dependent   Decreased Po Intake   Hypoglycemia   - A1C 6.2 today, 7.2 three months ago   - Admission glucose 53 on CMP   - Required multiples pushes of D50  - Initiated D10 gtt, glucoses improved day of admission   - Half life of glyburide is 10 hours, discontinued D10 9/18  - Low SSI for hyperglycemia overnight   - Consider low dose mirtazapine to help with decreased po intake   - Discontinue glyburide on discharge   - Hold metformin while inpatient     Leukocytosis, resolved  Febrile, resolved  Possible Prostatitis   Likely stress response in setting of hypoglycemia, low concern for  infectious etiology at this time; discontinue abx   - WBC 17.32 on admission with neutrophil predominance, Procal 0.19  - Covid negative   - CT chest with atelectasis  - Repeat with resolution  - UA without pyuria  - Discontinue Vanc & Zosyn   - Febrile 9/18 overnight, resolved without intervention   - UA culture sent  - Dystrophic prostatic calcifications on imaging   - Initiate Ciprofloxacin 500 mg q24h for 4-6 weeks (renal dosing)      Acute Urinary Retention    - Retaining 700cc on post void ultrasound   - s/p x2 I/O  - Continue post void residual ultrasound   - May require dey during admission if one more I/O cath  - Consult urology, appreciate recs  - Urology referral on discharge     Hyponatremia   - Na 134 on admission, eunatremic previously   - Repeat 132, likely in setting of dextrose containing fluids   - Discontinuing dextrose containing fluids   - Urine lytes    TALYA   - Admission Crt 1.5, baseline 1.1, increased to 1.9  - Likely due to urinary retention   - Encourage po intake  - Lytes as above, received 1L NS  - Avoid nephrotoxic medications     Hypomagnesemia   Hypokalemia  - Mag 1.1 on admission   - K low of 3.4  - Received 1g Mg and, 1x Phos NaK  - Replete prn     NAGMA  - Bicarb 16 on admission CMP   - Obtain urine lytes to calculate gap     Lactic Acidosis   - Lactic 2.4 on admission, repeat 1.3  - No further repeats     CAD s/p MI  - CABG/stent - 3 stents in 2011   - Troponin 0.020, no acute ischemic changes on EKG   - BNP 43 on admission  - Continue DAPT daily   - Consider deescalating to aspirin monotherapy     GERD  - Denies any reflux symptoms   - Minimal po intake   - Discontinue famotidine     Asbestosis Exposure   - Pleural plaque lower left lung seen on CT chest   - Follow up outpatient with PCP      VTE: Lovenox 40 daily   Diet: Diabetic   Code: Full  Dispo: >24 hours, possibly home tomorrow      Malinda Bolton, DO  [facility]  [facility] Hospitalist Medicine Team  B    \[facility] [place] [facility]\"" Medicine Hospitalist Pager numbers:   \[facility] [place] [facility]\"" Hospitalist Medicine Team A (Hilaria/Tamera): [phone]  \[facility] [place] [facility]\"" Hospitalist Medicine Team B (Wiliam/Dhiraj):  [phone]

## 2024-09-19 NOTE — PLAN OF CARE
Problem: Physical Therapy  Goal: Physical Therapy Goal  Description: Goals to be met by: 10/18/2024     Patient will increase functional independence with mobility by performin. Supine to sit with Modified [place]  2. Sit to supine with Modified [place]  3. Sit to stand transfer with Modified [place] with or without appropriate AD  4. Bed to chair transfer with Modified [place] with or without appropriate AD  5. Gait  x 200 feet with Modified [place] with or without appropriate AD  6. Lower extremity exercise program 2x10 reps with independence    Outcome: Progressing   Continue working toward goals.

## 2024-09-19 NOTE — HPI
Yoan Coyne is a 73 y.o. male with past medical history of diabetes, coronary artery disease with previous MI, hypertension, hyperlipidemia who presents to ED with complaint of generalized fatigue, confusion and urinary urgency x3 days.  His wife reports that since Sunday the patient has been sleeping a significant more amount.  Urology is consulted for acute urinary retention with TALYA.  At bedside the patient is afebrile with stable vital signs.  Creatinine is 1.9 (baseline creatinine 1.2), white count 9.4, UA from ED not concerning for infection, urine culture pending.  Blood cultures preliminarily with no growth.  CT AP unavailable for review due to inability to upload images.  CT scan from February of 2024 showing mild prostatomegaly with prostatic calcifications with normal-appearing bladder and kidneys.  Patient denies urologic history in the past.

## 2024-09-19 NOTE — PLAN OF CARE
Problem: Adult Inpatient Plan of Care  Goal: Plan of Care Review  Outcome: Progressing  Goal: Patient-Specific Goal (Individualized)  Outcome: Progressing     Problem: Urinary Retention  Goal: Effective Urinary Elimination  Outcome: Progressing     Problem: Diabetes Comorbidity  Goal: Blood Glucose Level Within Targeted Range  Outcome: Progressing

## 2024-09-19 NOTE — PLAN OF CARE
[place] - Telemetry  Initial Discharge Assessment       Primary Care Provider: Merline Puri MD    Admission Diagnosis: Hypomagnesemia [E83.42]  Hypoglycemia [E16.2]  Left lower lobe pneumonia [J18.9]  Acute encephalopathy [G93.40]  Cognitive decline [R41.89]  Acute urinary retention [R33.8]  Fever, unspecified fever cause [R50.9]  Pneumonia of left lower lobe due to infectious organism [J18.9]  Sepsis, due to unspecified organism, unspecified whether acute organ dysfunction present [A41.9]    Admission Date: 9/17/2024  Expected Discharge Date: 9/20/2024    Transition of Care Barriers: (P) None    Payor: MEDICARE / Plan: MEDICARE PART A & B / Product Type: Government /     Extended Emergency Contact Information  Primary Emergency Contact: Abi Coyne  Mobile Phone: [phone]  Relation: Spouse  Preferred language: English   needed? No    Discharge Plan A: (P) Home, Home with family, Home Health         [facility] - [facility] - [address]  [address]  [place] [address]  Phone: [phone] Fax: [phone]      Initial Assessment (most recent)       Adult Discharge Assessment - 09/19/24 1149          Discharge Assessment    Assessment Type Discharge Planning Assessment (P)      Confirmed/corrected address, phone number and insurance Yes (P)      Confirmed Demographics Correct on Facesheet (P)      Source of Information family (P)      People in Home spouse (P)      Do you expect to return to your current living situation? Yes (P)      Prior to hospitilization cognitive status: Alert/Oriented;No Deficits (P)      Current cognitive status: No Deficits;Alert/Oriented (P)      Walking or Climbing Stairs Difficulty no (P)      Dressing/Bathing Difficulty no (P)      Equipment Currently Used at Home none (P)      Readmission within 30 days? No (P)      Patient currently being followed by outpatient case management? No (P)      Do you currently have service(s) that help you  manage your care at home? No (P)      Do you take prescription medications? Yes (P)      Do you have any problems affording any of your prescribed medications? No (P)      Is the patient taking medications as prescribed? yes (P)      Who is going to help you get home at discharge? Abi Coyne (Spouse)  [phone] (Mobile) (P)      How do you get to doctors appointments? family or friend will provide (P)      Are you on dialysis? No (P)      Discharge Plan A Home;Home with family;Home Health (P)      DME Needed Upon Discharge  other (see comments) (P)    tbd    Discharge Plan discussed with: Spouse/sig other (P)      Name(s) and Number(s) Abi Coyne (Spouse)  [phone] (Mobile) (P)      Transition of Care Barriers None (P)         Physical Activity    On average, how many days per week do you engage in moderate to strenuous exercise (like a brisk walk)? 0 days (P)      On average, how many minutes do you engage in exercise at this level? 0 min (P)         Financial Resource Strain    How hard is it for you to pay for the very basics like food, housing, medical care, and heating? Not hard at all (P)         Housing Stability    In the last 12 months, was there a time when you were not able to pay the mortgage or rent on time? No (P)      At any time in the past 12 months, were you homeless or living in a shelter (including now)? No (P)         Transportation Needs    Has the lack of transportation kept you from medical appointments, meetings, work or from getting things needed for daily living? No (P)         Food Insecurity    Within the past 12 months, you worried that your food would run out before you got the money to buy more. Never true (P)      Within the past 12 months, the food you bought just didn't last and you didn't have money to get more. Never true (P)         Stress    Do you feel stress - tense, restless, nervous, or anxious, or unable to sleep at night because your mind is troubled all the  time - these days? Not at all (P)         Social Isolation    How often do you feel lonely or isolated from those around you?  Never (P)         Alcohol Use    Q1: How often do you have a drink containing alcohol? Never (P)         Utilities    In the past 12 months has the electric, gas, oil, or water company threatened to shut off services in your home? No (P)         Health Literacy    How often do you need to have someone help you when you read instructions, pamphlets, or other written material from your doctor or pharmacy? Never (P)         OTHER    Name(s) of People in Home Abi Coyne (Spouse)  [phone] (Mobile) (P)                    Future Appointments   Date Time Provider Department Center   9/30/2024 10:30 AM Irish Buchanan MD [facility]       Orders Placed This Encounter   Procedures    Ambulatory referral/consult to Neurology     Standing Status:   Future     Standing Expiration Date:   10/18/2025     Referral Priority:   Routine     Referral Type:   Consultation     Referral Reason:   Specialty Services Required     Requested Specialty:   Neurology     Number of Visits Requested:   1    Ambulatory referral/consult to Urology     Standing Status:   Future     Standing Expiration Date:   10/18/2025     Referral Priority:   Routine     Referral Type:   Consultation     Referral Reason:   Specialty Services Required     Requested Specialty:   Urology     Number of Visits Requested:   1     Consults (From admission, onward)          Status Ordering Provider     Inpatient consult to Urology  Once        Provider:  (Not yet assigned)    Acknowledged ALYSHA KELLY

## 2024-09-19 NOTE — SUBJECTIVE & OBJECTIVE
Past Medical History:   Diagnosis Date    Coronary artery disease     Diabetes mellitus     Hypertension        Past Surgical History:   Procedure Laterality Date    REMOVAL-TUBE  4/12/2024    Procedure: REMOVAL-TUBE (INTRA-ABDOMINAL TUBE);  Surgeon: Wily Ugarte MD;  Location: [facility] OR;  Service: General;;    ROBOT-ASSISTED CHOLECYSTECTOMY N/A 4/12/2024    Procedure: ROBOTIC CHOLECYSTECTOMY;  Surgeon: Wily Ugarte MD;  Location: [facility] OR;  Service: General;  Laterality: N/A;       Review of patient's allergies indicates:  No Known Allergies    Family History    None         Tobacco Use    Smoking status: Never     Passive exposure: Never    Smokeless tobacco: Never   Substance and Sexual Activity    Alcohol use: Not on file    Drug use: Not on file    Sexual activity: Not on file       Review of Systems   Constitutional:  Negative for activity change, fatigue, fever and unexpected weight change.   HENT:  Negative for sore throat and trouble swallowing.    Eyes:  Negative for pain and discharge.   Respiratory:  Negative for chest tightness and shortness of breath.    Cardiovascular:  Negative for chest pain and palpitations.   Gastrointestinal:  Negative for abdominal pain, constipation, diarrhea, nausea and vomiting.   Genitourinary:         As per HPI   Musculoskeletal:  Negative for back pain and gait problem.   Skin:  Negative for rash and wound.   Neurological:  Negative for seizures and numbness.   Psychiatric/Behavioral:  Negative for hallucinations. The patient is not nervous/anxious.        Objective:     Temp:  [97.8 °F (36.6 °C)-100.8 °F (38.2 °C)] 98.1 °F (36.7 °C)  Pulse:  [100-116] 100  Resp:  [17-23] 18  SpO2:  [97 %-100 %] 97 %  BP: ()/(54-79) 95/54  Weight: 73.6 kg (162 lb 4.1 oz)  Body mass index is 25.41 kg/m².    Date 09/19/24 0700 - 09/20/24 0659   Shift 1293-2048 6121-1292 7440-5500 24 Hour Total   INTAKE   P.O. 150   150   Shift Total(mL/kg) 150(2)   150(2)   OUTPUT   Shift  Total(mL/kg)       Weight (kg) 73.6 73.6 73.6 73.6     Bladder Scan Volume (mL): 727 mL (09/19/24 0630)  Post Void Cath Residual Output (mL): 79 mL (09/19/24 0640)    Drains       None                    Physical Exam  Vitals and nursing note reviewed.   Constitutional:       Appearance: Normal appearance.   HENT:      Head: Atraumatic.      Nose: Nose normal.   Eyes:      Extraocular Movements: Extraocular movements intact.      Pupils: Pupils are equal, round, and reactive to light.   Cardiovascular:      Rate and Rhythm: Normal rate.   Pulmonary:      Effort: Pulmonary effort is normal.   Abdominal:      General: Abdomen is flat. There is no distension.      Tenderness: There is no abdominal tenderness. There is no right CVA tenderness or left CVA tenderness.   Musculoskeletal:         General: Normal range of motion.      Cervical back: Normal range of motion.   Skin:     Coloration: Skin is not jaundiced.   Neurological:      General: No focal deficit present.      Mental Status: He is alert and oriented to person, place, and time.   Psychiatric:         Mood and Affect: Mood normal.         Behavior: Behavior normal.          Significant Labs:    BMP:  Recent Labs   Lab 09/18/24  0354 09/18/24  1948 09/19/24  0258   * 130* 133*   K 3.4* 4.0 4.0    103 106   CO2 17* 18* 17*   BUN 16 20 20   CREATININE 1.5* 1.9* 1.9*   CALCIUM 9.1 9.4 9.1       CBC:  Recent Labs   Lab 09/17/24  2156 09/18/24  0354 09/19/24  0258   WBC 17.32* 11.39 9.41   HGB 14.2 12.4* 12.2*   HCT 41.2 37.2* 35.8*    193 202       All pertinent labs results from the past 24 hours have been reviewed.    Significant Imaging:  All pertinent imaging results/findings from the past 24 hours have been reviewed.

## 2024-09-19 NOTE — PROGRESS NOTES
Pharmacist Renal Dose Adjustment Note    Yoan Coyne is a 73 y.o. male being treated with the medication ciprofloxacin    Patient Data:    Vital Signs (Most Recent):  Temp: 97.8 °F (36.6 °C) (09/19/24 1115)  Pulse: 92 (09/19/24 1149)  Resp: 18 (09/19/24 1115)  BP: (!) 101/55 (09/19/24 1115)  SpO2: 97 % (09/19/24 1115) Vital Signs (72h Range):  Temp:  [97.8 °F (36.6 °C)-100.8 °F (38.2 °C)]   Pulse:  []   Resp:  [17-28]   BP: ()/()   SpO2:  [96 %-100 %]      Recent Labs   Lab 09/18/24  0354 09/18/24 1948 09/19/24  0258   CREATININE 1.5* 1.9* 1.9*     Serum creatinine: 1.9 mg/dL (H) 09/19/24 0258  Estimated creatinine clearance: 33.3 mL/min (A)    Medication:ciprofloxacin dose: 500mg frequency q24h will be changed to medication:ciprofloxacin dose:500mg frequency:bid    Pharmacist's Name: Andriy Garcia  Pharmacist's Extension: [phone]

## 2024-09-19 NOTE — CONSULTS
[place] [facility]  Urology  Consult Note    Patient Name: Yoan Coyne  MRN: [id number]  Admission Date: 9/17/2024  Hospital Length of Stay: 2   Code Status: Full Code   Attending Provider: Edward Hearn MD   Consulting Provider: Leland Guthrie MD  Primary Care Physician: Merline Puri MD  Principal Problem:Hypoglycemia    Inpatient consult to Urology  Consult performed by: Leland Guthrie MD  Consult ordered by: Rupali Lan MD          Subjective:     HPI:  Yoan Coyne is a 73 y.o. male with past medical history of diabetes, coronary artery disease with previous MI, hypertension, hyperlipidemia who presents to ED with complaint of generalized fatigue, confusion and urinary urgency x3 days.  His wife reports that since Sunday the patient has been sleeping a significant more amount.  Urology is consulted for acute urinary retention with TALYA.  At bedside the patient is afebrile with stable vital signs.  Creatinine is 1.9 (baseline creatinine 1.2), white count 9.4, UA from ED not concerning for infection, urine culture pending.  Blood cultures preliminarily with no growth.  CT AP unavailable for review due to inability to upload images.  CT scan from February of 2024 showing mild prostatomegaly with prostatic calcifications with normal-appearing bladder and kidneys.  Patient denies urologic history in the past.    Past Medical History:   Diagnosis Date    Coronary artery disease     Diabetes mellitus     Hypertension        Past Surgical History:   Procedure Laterality Date    REMOVAL-TUBE  4/12/2024    Procedure: REMOVAL-TUBE (INTRA-ABDOMINAL TUBE);  Surgeon: Wily Ugarte MD;  Location: [facility] OR;  Service: General;;    ROBOT-ASSISTED CHOLECYSTECTOMY N/A 4/12/2024    Procedure: ROBOTIC CHOLECYSTECTOMY;  Surgeon: Wily Ugarte MD;  Location: [facility] OR;  Service: General;  Laterality: N/A;       Review of patient's allergies indicates:  No Known Allergies    Family History    None         Tobacco  Use    Smoking status: Never     Passive exposure: Never    Smokeless tobacco: Never   Substance and Sexual Activity    Alcohol use: Not on file    Drug use: Not on file    Sexual activity: Not on file       Review of Systems   Constitutional:  Negative for activity change, fatigue, fever and unexpected weight change.   HENT:  Negative for sore throat and trouble swallowing.    Eyes:  Negative for pain and discharge.   Respiratory:  Negative for chest tightness and shortness of breath.    Cardiovascular:  Negative for chest pain and palpitations.   Gastrointestinal:  Negative for abdominal pain, constipation, diarrhea, nausea and vomiting.   Genitourinary:         As per HPI   Musculoskeletal:  Negative for back pain and gait problem.   Skin:  Negative for rash and wound.   Neurological:  Negative for seizures and numbness.   Psychiatric/Behavioral:  Negative for hallucinations. The patient is not nervous/anxious.        Objective:     Temp:  [97.8 °F (36.6 °C)-100.8 °F (38.2 °C)] 98.1 °F (36.7 °C)  Pulse:  [100-116] 100  Resp:  [17-23] 18  SpO2:  [97 %-100 %] 97 %  BP: ()/(54-79) 95/54  Weight: 73.6 kg (162 lb 4.1 oz)  Body mass index is 25.41 kg/m².    Date 09/19/24 0700 - 09/20/24 0659   Shift 4715-1895 5054-8150 0306-5132 24 Hour Total   INTAKE   P.O. 150   150   Shift Total(mL/kg) 150(2)   150(2)   OUTPUT   Shift Total(mL/kg)       Weight (kg) 73.6 73.6 73.6 73.6     Bladder Scan Volume (mL): 727 mL (09/19/24 0630)  Post Void Cath Residual Output (mL): 79 mL (09/19/24 0640)    Drains       None                    Physical Exam  Vitals and nursing note reviewed.   Constitutional:       Appearance: Normal appearance.   HENT:      Head: Atraumatic.      Nose: Nose normal.   Eyes:      Extraocular Movements: Extraocular movements intact.      Pupils: Pupils are equal, round, and reactive to light.   Cardiovascular:      Rate and Rhythm: Normal rate.   Pulmonary:      Effort: Pulmonary effort is normal.    Abdominal:      General: Abdomen is flat. There is no distension.      Tenderness: There is no abdominal tenderness. There is no right CVA tenderness or left CVA tenderness.   Musculoskeletal:         General: Normal range of motion.      Cervical back: Normal range of motion.   Skin:     Coloration: Skin is not jaundiced.   Neurological:      General: No focal deficit present.      Mental Status: He is alert and oriented to person, place, and time.   Psychiatric:         Mood and Affect: Mood normal.         Behavior: Behavior normal.          Significant Labs:    BMP:  Recent Labs   Lab 09/18/24 0354 09/18/24 1948 09/19/24 0258   * 130* 133*   K 3.4* 4.0 4.0    103 106   CO2 17* 18* 17*   BUN 16 20 20   CREATININE 1.5* 1.9* 1.9*   CALCIUM 9.1 9.4 9.1       CBC:  Recent Labs   Lab 09/17/24 2156 09/18/24 0354 09/19/24 0258   WBC 17.32* 11.39 9.41   HGB 14.2 12.4* 12.2*   HCT 41.2 37.2* 35.8*    193 202       All pertinent labs results from the past 24 hours have been reviewed.    Significant Imaging:  All pertinent imaging results/findings from the past 24 hours have been reviewed.                    Assessment and Plan:     Acute urinary retention  - please place Henry catheter  - maintain Henry catheter for 5-7 days, we will schedule outpatient follow up for voiding trial  - no concerns for UTI at this time, please follow up all urine cultures and tailor antibiotics as needed.  If urine culture is positive for bacteria, recommend 7 a total course of antibiotics  - encouraged continued hydration  - continue Flomax upon discharge  - recommend bowel regimen to ensure daily bowel movement  - all of the recommendations per primary team  - please reach out to Urology with questions or concerns, we will sign off at this time        VTE Risk Mitigation (From admission, onward)           Ordered     enoxaparin injection 40 mg  Daily         09/18/24 0209     IP VTE HIGH RISK PATIENT  Once          09/18/24 0209     Place sequential compression device  Until discontinued         09/18/24 0209                    Thank you for your consult. I will sign off. Please contact us if you have any additional questions.    Leland Guthrie MD  Urology  [place] [facility]

## 2024-09-19 NOTE — ASSESSMENT & PLAN NOTE
- please place Henry catheter  - maintain Henry catheter for 5-7 days, we will schedule outpatient follow up for voiding trial  - no concerns for UTI at this time, please follow up all urine cultures and tailor antibiotics as needed.  If urine culture is positive for bacteria, recommend 7 a total course of antibiotics  - encouraged continued hydration  - continue Flomax upon discharge  - recommend bowel regimen to ensure daily bowel movement  - all of the recommendations per primary team  - please reach out to Urology with questions or concerns, we will sign off at this time   Hi, you seen patient at [facility] on 2/24/2020, she called and said that the Hives are worse and is requesting Steroids like discussed in visit.      Please advise,  Cinthia Mccall  [phone]

## 2024-09-19 NOTE — PT/OT/SLP PROGRESS
Occupational Therapy  Missed Visit    Patient Name:  Yoan Coyne   MRN:  [id number]    Patient not seen today secondary to Patient unwilling to participate, Patient fatigue. Will follow-up .    9/19/2024

## 2024-09-20 VITALS
DIASTOLIC BLOOD PRESSURE: 71 MMHG | SYSTOLIC BLOOD PRESSURE: 117 MMHG | RESPIRATION RATE: 20 BRPM | WEIGHT: 162.25 LBS | HEIGHT: 67 IN | TEMPERATURE: 98 F | HEART RATE: 84 BPM | BODY MASS INDEX: 25.47 KG/M2 | OXYGEN SATURATION: 98 %

## 2024-09-20 LAB
ALBUMIN SERPL BCP-MCNC: 2.5 G/DL (ref 3.5–5.2)
ALP SERPL-CCNC: 59 U/L (ref 55–135)
ALT SERPL W/O P-5'-P-CCNC: 23 U/L (ref 10–44)
ANION GAP SERPL CALC-SCNC: 9 MMOL/L (ref 8–16)
AST SERPL-CCNC: 23 U/L (ref 10–40)
BACTERIA UR CULT: NO GROWTH
BASOPHILS # BLD AUTO: 0.03 K/UL (ref 0–0.2)
BASOPHILS NFR BLD: 0.4 % (ref 0–1.9)
BILIRUB SERPL-MCNC: 0.8 MG/DL (ref 0.1–1)
BUN SERPL-MCNC: 20 MG/DL (ref 8–23)
CALCIUM SERPL-MCNC: 8.8 MG/DL (ref 8.7–10.5)
CHLORIDE SERPL-SCNC: 107 MMOL/L (ref 95–110)
CO2 SERPL-SCNC: 19 MMOL/L (ref 23–29)
CREAT SERPL-MCNC: 1.6 MG/DL (ref 0.5–1.4)
DIFFERENTIAL METHOD BLD: ABNORMAL
EOSINOPHIL # BLD AUTO: 0.5 K/UL (ref 0–0.5)
EOSINOPHIL NFR BLD: 6.9 % (ref 0–8)
ERYTHROCYTE [DISTWIDTH] IN BLOOD BY AUTOMATED COUNT: 13.1 % (ref 11.5–14.5)
EST. GFR  (NO RACE VARIABLE): 46 ML/MIN/1.73 M^2
GLUCOSE SERPL-MCNC: 175 MG/DL (ref 70–110)
HCT VFR BLD AUTO: 33 % (ref 40–54)
HGB BLD-MCNC: 11.2 G/DL (ref 14–18)
IMM GRANULOCYTES # BLD AUTO: 0.03 K/UL (ref 0–0.04)
IMM GRANULOCYTES NFR BLD AUTO: 0.4 % (ref 0–0.5)
LYMPHOCYTES # BLD AUTO: 1.6 K/UL (ref 1–4.8)
LYMPHOCYTES NFR BLD: 22.5 % (ref 18–48)
MAGNESIUM SERPL-MCNC: 1.9 MG/DL (ref 1.6–2.6)
MCH RBC QN AUTO: 30.1 PG (ref 27–31)
MCHC RBC AUTO-ENTMCNC: 33.9 G/DL (ref 32–36)
MCV RBC AUTO: 89 FL (ref 82–98)
MONOCYTES # BLD AUTO: 1.6 K/UL (ref 0.3–1)
MONOCYTES NFR BLD: 22.4 % (ref 4–15)
NEUTROPHILS # BLD AUTO: 3.5 K/UL (ref 1.8–7.7)
NEUTROPHILS NFR BLD: 47.4 % (ref 38–73)
NRBC BLD-RTO: 0 /100 WBC
PHOSPHATE SERPL-MCNC: 3.4 MG/DL (ref 2.7–4.5)
PLATELET # BLD AUTO: 196 K/UL (ref 150–450)
PMV BLD AUTO: 9.5 FL (ref 9.2–12.9)
POCT GLUCOSE: 167 MG/DL (ref 70–110)
POCT GLUCOSE: 266 MG/DL (ref 70–110)
POTASSIUM SERPL-SCNC: 3.8 MMOL/L (ref 3.5–5.1)
PROT SERPL-MCNC: 6.1 G/DL (ref 6–8.4)
RBC # BLD AUTO: 3.72 M/UL (ref 4.6–6.2)
SODIUM SERPL-SCNC: 135 MMOL/L (ref 136–145)
WBC # BLD AUTO: 7.29 K/UL (ref 3.9–12.7)

## 2024-09-20 PROCEDURE — G0008 ADMIN INFLUENZA VIRUS VAC: HCPCS | Performed by: INTERNAL MEDICINE

## 2024-09-20 PROCEDURE — 25000003 PHARM REV CODE 250: Performed by: INTERNAL MEDICINE

## 2024-09-20 PROCEDURE — 90653 IIV ADJUVANT VACCINE IM: CPT | Performed by: INTERNAL MEDICINE

## 2024-09-20 PROCEDURE — 84100 ASSAY OF PHOSPHORUS: CPT

## 2024-09-20 PROCEDURE — 85025 COMPLETE CBC W/AUTO DIFF WBC: CPT

## 2024-09-20 PROCEDURE — 90471 IMMUNIZATION ADMIN: CPT | Performed by: INTERNAL MEDICINE

## 2024-09-20 PROCEDURE — 36415 COLL VENOUS BLD VENIPUNCTURE: CPT

## 2024-09-20 PROCEDURE — 3E02340 INTRODUCTION OF INFLUENZA VACCINE INTO MUSCLE, PERCUTANEOUS APPROACH: ICD-10-PCS | Performed by: INTERNAL MEDICINE

## 2024-09-20 PROCEDURE — 25000003 PHARM REV CODE 250

## 2024-09-20 PROCEDURE — 80053 COMPREHEN METABOLIC PANEL: CPT

## 2024-09-20 PROCEDURE — 63600175 PHARM REV CODE 636 W HCPCS: Performed by: INTERNAL MEDICINE

## 2024-09-20 PROCEDURE — 97530 THERAPEUTIC ACTIVITIES: CPT

## 2024-09-20 PROCEDURE — 83735 ASSAY OF MAGNESIUM: CPT

## 2024-09-20 RX ORDER — TAMSULOSIN HYDROCHLORIDE 0.4 MG/1
0.4 CAPSULE ORAL DAILY
Qty: 30 CAPSULE | Refills: 11 | Status: SHIPPED | OUTPATIENT
Start: 2024-09-20 | End: 2025-09-20

## 2024-09-20 RX ORDER — POLYETHYLENE GLYCOL 3350 17 G/17G
17 POWDER, FOR SOLUTION ORAL DAILY PRN
Qty: 510 G | Refills: 0 | Status: SHIPPED | OUTPATIENT
Start: 2024-09-20 | End: 2024-09-20 | Stop reason: HOSPADM

## 2024-09-20 RX ADMIN — TAMSULOSIN HYDROCHLORIDE 0.4 MG: 0.4 CAPSULE ORAL at 08:09

## 2024-09-20 RX ADMIN — INFLUENZA A VIRUS A/VICTORIA/4897/2022 IVR-238 (H1N1) ANTIGEN (FORMALDEHYDE INACTIVATED), INFLUENZA A VIRUS A/THAILAND/8/2022 IVR-237 (H3N2) ANTIGEN (FORMALDEHYDE INACTIVATED), INFLUENZA B VIRUS B/AUSTRIA/1359417/2021 BVR-26 ANTIGEN (FORMALDEHYDE INACTIVATED) 0.5 ML: 15; 15; 15 INJECTION, SUSPENSION INTRAMUSCULAR at 11:09

## 2024-09-20 RX ADMIN — ASPIRIN 81 MG: 81 TABLET, COATED ORAL at 08:09

## 2024-09-20 RX ADMIN — INSULIN ASPART 3 UNITS: 100 INJECTION, SOLUTION INTRAVENOUS; SUBCUTANEOUS at 12:09

## 2024-09-20 RX ADMIN — CIPROFLOXACIN 500 MG: 500 TABLET ORAL at 08:09

## 2024-09-20 RX ADMIN — MUPIROCIN: 20 OINTMENT TOPICAL at 08:09

## 2024-09-20 RX ADMIN — CLOPIDOGREL BISULFATE 75 MG: 75 TABLET ORAL at 08:09

## 2024-09-20 NOTE — DISCHARGE SUMMARY
[facility] Medicine Discharge Summary    Primary Team: [facility] Hospitalist Team B  Attending Physician: Edward Hearn MD  Resident: Rupali Lan  Intern: Malinda Bolton    Date of Admit: 9/17/2024  Date of Discharge: 9/20/2024    Discharge to: Home/Self-Care  Condition: Stable    Discharge Diagnoses     Patient Active Problem List   Diagnosis    Acute cholecystitis due to biliary calculus    Primary hypertension    Type 2 diabetes mellitus without complication, without long-term current use of insulin    Coronary artery disease without angina pectoris    Chronic cholecystitis    Other hyperlipidemia    Acute urinary retention    GERD (gastroesophageal reflux disease)    Shuffling gait    Hypoglycemia    Acute encephalopathy    Cognitive impairment    Hyponatremia     Day of Discharge Physical Exam / Vitals      Vitals Value Taken Time   /71 09/20/24 1135   Temp 97.7 °F (36.5 °C) 09/20/24 0732   Pulse 84 09/20/24 1222   Resp 20 09/20/24 1135   SpO2 98 % 09/20/24 1135     Physical Exam  Vitals reviewed.   Constitutional:       General: He is not in acute distress.     Appearance: He is not toxic-appearing.   HENT:      Head: Normocephalic.      Nose: Nose normal.      Mouth/Throat:      Mouth: Mucous membranes are moist.      Comments: Top denture in place  Eyes:      General: No scleral icterus.     Conjunctiva/sclera: Conjunctivae normal.   Cardiovascular:      Rate and Rhythm: Normal rate and regular rhythm.      Pulses: Normal pulses.   Pulmonary:      Effort: Pulmonary effort is normal. No respiratory distress.      Breath sounds: No wheezing.   Abdominal:      General: There is no distension.      Palpations: Abdomen is soft.      Tenderness: There is no abdominal tenderness.   Musculoskeletal:      Right lower leg: No edema.      Left lower leg: No edema.   Feet:      Comments: Right pinky toe amputated, no erythema or drainage, well healed site   Dorsal inferior foot with s/p debridement, firmness  of inferior foot   Skin:     General: Skin is warm and dry.      Coloration: Skin is not jaundiced or pale.   Neurological:      General: No focal deficit present.      Mental Status: He is alert and oriented to person, place, and time.      Cranial Nerves: No dysarthria or facial asymmetry.      Motor: No weakness.   Psychiatric:         Mood and Affect: Mood normal.         Behavior: Behavior normal.       Consultants and Procedures     Consultants:  Urology    Procedures:   None     Brief History of Present Illness      Yoan Coyne is a 73 y.o. male with past medical history of non-insulin dependent T2DM, coronary artery disease with previous MI in 2011 s/p CABG & stent, hypertension, hyperlipidemia, asbestos exposure who presents to ED with complaint of generalized fatigue, confusion and urinary urgency x3 days. Patient admitted to [facility] for acute encephalopathy, persistent hypoglycemia requiring D10 gtt. Hypoglycemia & encephalopathy 2/2 no po intake while taking glyburide, now off D10 gtt, maintaining glucoses. Now with urinary retention requiring I/O likely 2/2 BPH, s/p dey placement 9/19. Symptoms resolved during admission, discontinue glyburide & follow with urology outpatient for voiding trial.     For the full HPI please refer to the History & Physical from this admission.    Hospital Course By Problem with Pertinent Findings     Acute Encephalopathy, resolved   Cognitive Impairment   Likely in setting of hypoglycemia 2/2 no po intake of glyburide, resolved after dextrose.   - Patient recently with urinary retention / urinary incontinence, shuffling gait, decline in cognitive function; NPH on differential, now ruled out with negative CT head.   - TSH, T4 with subclinical hyperthyroidism, consider repeat outpatient   - Ethanol <3, AM cortisol 23.8  - CT head without acute intracranial process.   - Referral to neurology for neurocognitive testing outpatient, complicated by inability to read or  write     Type 2 Diabetes Mellitus - Non-Insulin Dependent   Decreased Po Intake   Hypoglycemia   - A1C 6.2 on admission, 7.2 three months ago; Admission glucose 53 on CMP   - Required multiples pushes of D50 in ED  - Initiated D10 gtt, glucoses improved day of admission   - Half life of glyburide is 10 hours, discontinued D10 9/18  - Low SSI for hyperglycemia overnight   - Consider low dose mirtazapine to help with decreased po intake   - Discontinue glyburide on discharge   - Hold metformin while inpatient, restart outpatient      Leukocytosis, resolved  Febrile, resolved  Possible Prostatitis   Likely stress response in setting of hypoglycemia  - WBC 17.32 on admission with neutrophil predominance, Procal 0.19  - Covid negative   - CT chest with atelectasis  - UA culture NG  - Discontinued Vanc & Zosyn   - Dystrophic prostatic calcifications on imaging   - Started on Ciprofloxacin but discontinued, prior to discharge, will follow up urology outaptient      Acute Urinary Retention    Received famotidine one time but otherwise no new medication but dystrophic prostate seen on CT AP, likely etiology of retention  - Retaining 700cc on post void ultrasound   - s/p x2 I/O  - Urology consulted, recommended placing dey & following up outaptient   - Void trial in one week     Hyponatremia, resolved  - Na 134 on admission, eunatremic previously   - Repeat 132, likely in setting of dextrose containing fluids & poor po intake   - Discontinuing dextrose containing fluids      TALYA 2/2 Urinary Obstruction, improving  - Admission Crt 1.5, baseline 1.1, increased to 1.9  - Received 1L NS in ED  - Likely due to acute urinary retention  - Improving with dey  - Avoid nephrotoxic medications      Hypomagnesemia, resolved  Hypokalemia, resolved  - Mag 1.1 on admission   - K low of 3.4  - Repleted prn      Lactic Acidosis, resolved  - Lactic 2.4 on admission, repeat 1.3  - No further repeats      CAD s/p MI  - CABG/stent - 3 stents  in 2011   - Troponin 0.020, no acute ischemic changes on EKG   - BNP 43 on admission  - Continue DAPT daily   - Consider deescalating DAPT outpatient, follow up with cardiology      GERD  - Denies any reflux symptoms   - Minimal po intake   - Discontinue famotidine      Asbestosis Exposure   - Pleural plaque lower left lung seen on CT chest   - Follow up outpatient with PCP      Discharge Medications        Medication List        START taking these medications      tamsulosin 0.4 mg Cap  Commonly known as: FLOMAX  Take 1 capsule (0.4 mg total) by mouth once daily.            CONTINUE taking these medications      aspirin 81 MG EC tablet  Commonly known as: ECOTRIN     atorvastatin 40 MG tablet  Commonly known as: LIPITOR     clopidogreL 75 mg tablet  Commonly known as: PLAVIX     * metFORMIN 1000 MG tablet  Commonly known as: GLUCOPHAGE     * metFORMIN 500 MG tablet  Commonly known as: GLUCOPHAGE     metoprolol tartrate 100 MG tablet  Commonly known as: LOPRESSOR     ramipriL 2.5 MG capsule  Commonly known as: ALTACE           * This list has 2 medication(s) that are the same as other medications prescribed for you. Read the directions carefully, and ask your doctor or other care provider to review them with you.                STOP taking these medications      glyBURIDE 5 MG tablet  Commonly known as: DIABETA     ibuprofen 200 MG tablet  Commonly known as: ADVIL,MOTRIN               Where to Get Your Medications        These medications were sent to [facility] [address], [place] [id number]      Hours: Mon-Fri, 8a-5:30p Phone: [phone]   tamsulosin 0.4 mg Cap          Discharge Information:   Diet:  Diabetic 2000 kcal     Physical Activity:  As tolerated             Instructions:  1. Take all medications as prescribed  2. Keep all follow-up appointments  3. Return to the hospital or call your primary care physicians if any worsening symptoms such as fever, chest pain, shortness of  breath, return of symptoms, or any other concerns.    Follow-Up Appointments:  9/24 - Urology follow up for retention, BPH eval & voiding trial   9/30 - [facility] Follow Up   11/4 - Neurology follow up for cognitive impairment evaluation      Malinda Bolton DO  [facility]  09/20/2024 10:01 AM

## 2024-09-20 NOTE — NURSING
1319- Leg bag teaching and dey care teaching complete. Pt and family voiced understanding of both.   no history of blood product transfusion

## 2024-09-20 NOTE — PLAN OF CARE
[facility] - Telemetry  Discharge Final Note    Primary Care Provider: Merline Puri MD    Expected Discharge Date: 9/20/2024    Pharmacist will go over home medications and reasons for medications. VN and bedside nurse to reiterate final discharge instructions.     Cleared from CM . Bedside Nurse and VN notified.    DC plan as listed below in CM flowsheet.    Spouse to transport home at DC.    Final Discharge Note (most recent)       Final Note - 09/20/24 1051          Final Note    Assessment Type Final Discharge Note (P)      Anticipated Discharge Disposition Home or Self Care (P)      Hospital Resources/Appts/Education Provided Appointments scheduled and added to AVS (P)         Post-Acute Status    Post-Acute Authorization Other (P)      Other Status No Post-Acute Service Needs (P)      Discharge Delays None known at this time (P)                    Future Appointments   Date Time Provider Department Center   9/24/2024  8:15 AM Farhan Asencio MD [facility] UROLOGY [facility]   9/30/2024 10:30 AM Irish Buchanan MD [facility] IMPRI [facility]   11/4/2024  9:20 AM Jona Boateng MD [facility] NEURO [facility]       Contact Info       Merline Puri MD   Specialty: Family Medicine   Relationship: PCP - General    [address] [facility]  [place] [id number]   Phone: [phone]       Next Steps: Follow up          Orders Placed This Encounter   Procedures    Ambulatory referral/consult to Neurology     Standing Status:   Future     Standing Expiration Date:   10/18/2025     Referral Priority:   Routine     Referral Type:   Consultation     Referral Reason:   Specialty Services Required     Requested Specialty:   Neurology     Number of Visits Requested:   1    Ambulatory referral/consult to Urology     Standing Status:   Future     Standing Expiration Date:   10/18/2025     Referral Priority:   Routine     Referral Type:   Consultation     Referral Reason:   Specialty Services Required     Requested  Specialty:   Urology     Number of Visits Requested:   1

## 2024-09-20 NOTE — PLAN OF CARE
Problem: Adult Inpatient Plan of Care  Goal: Plan of Care Review  Outcome: Progressing  Goal: Readiness for Transition of Care  Outcome: Progressing     Problem: Diabetes Comorbidity  Goal: Blood Glucose Level Within Targeted Range  Outcome: Progressing

## 2024-09-20 NOTE — PT/OT/SLP PROGRESS
"Occupational Therapy   Treatment    Name: Yoan Coyne  MRN: [id number]  Admitting Diagnosis:  Hypoglycemia       Recommendations:     Discharge Recommendations: No Therapy Indicated  Discharge Equipment Recommendations:  shower chair  Barriers to discharge:  None    Assessment:    Per pt/spouse request, acute OT to d/c this date. Pt/spouse state that pt has returned to PLOF (I). DC OT.    Yoan Coyne is a 73 y.o. male with a medical diagnosis of Hypoglycemia.  He presents with performance deficits affecting function are impaired endurance.     Rehab Prognosis:  Good; patient would benefit from acute skilled OT services to address these deficits and reach maximum level of function.       Plan:     Patient to be seen 3 x/week to address the above listed problems via self-care/home management, therapeutic activities, therapeutic exercises  Plan of Care Expires: 10/18/24  Plan of Care Reviewed with: patient, spouse    Subjective     Chief Complaint: "I don't need yall for therapy"  Patient/Family Comments/goals: return home  Pain/Comfort:  Pain Rating 1: 0/10  Pain Rating Post-Intervention 1: 0/10    Objective:     Communicated with: nsg prior to session.  Patient found HOB elevated with bed alarm, peripheral IV upon OT entry to room.    General Precautions: Standard, fall    Orthopedic Precautions:N/A  Braces: N/A  Respiratory Status: Room air     Occupational Performance:     Bed Mobility:         Functional Mobility/Transfers:    Functional Mobility:     Activities of Daily Living:        [facility] 6 Click ADL: 24    Treatment & Education:   Pt found in SF w/ spouse in room & declined active OT tasks this date 2/2 having just returned BTB after toileting on toilet w/ SBA per pt/spouse. Pt stated that he is (I) w/ BADLs & no longer required acute OT svcs.  Discussed energy conservation techs & PLBing w/ rec for shower chair.  Provided red Tband w/ handout for UB TE/HEP.  Edu/tx re: general safety techs, endurance tx, " PLBing & HEP. Pt/spouse verbalized understanding.        Patient left HOB elevated with all lines intact, call button in reach, nsg notified, and spouse present    GOALS:   Multidisciplinary Problems       Occupational Therapy Goals          Problem: Occupational Therapy    Goal Priority Disciplines Outcome Interventions   Occupational Therapy Goal     OT, PT/OT Unable to Meet    Description: Goals to be met by: 10/18/2024      Patient will increase functional independence with ADLs by performing:    UE Dressing with Modified [place].  LE Dressing with Modified [place].  Grooming while standing with Modified [place].  Toileting from toilet with Modified [place] for hygiene and clothing management.   Toilet transfer to toilet with Modified [place].  Increased functional strength to WFL for self care.  Upper extremity exercise program x10 reps per handout, with independence.                          Time Tracking:     OT Date of Treatment: 09/20/24  OT Start Time: 1007  OT Stop Time: 1032  OT Total Time (min): 25 min    Billable Minutes:Therapeutic Activity 25  Total Time 25    OT/PRADEEP: OT     Number of PRADEEP visits since last OT visit: 0    9/20/2024

## 2024-09-20 NOTE — PLAN OF CARE
Problem: Adult Inpatient Plan of Care  Goal: Plan of Care Review  9/20/2024 1232 by Logan Tran RN  Outcome: Progressing  9/20/2024 1231 by Logan Tran RN  Outcome: Progressing  Goal: Patient-Specific Goal (Individualized)  9/20/2024 1232 by Logan Tran RN  Outcome: Progressing  9/20/2024 1231 by Logan Tran RN  Outcome: Progressing  Goal: Absence of Hospital-Acquired Illness or Injury  9/20/2024 1232 by Logan Tran RN  Outcome: Progressing  9/20/2024 1231 by Logan Tran RN  Outcome: Progressing

## 2024-09-20 NOTE — PLAN OF CARE
Problem: Occupational Therapy  Goal: Occupational Therapy Goal  Description: Goals to be met by: 10/18/2024      Patient will increase functional independence with ADLs by performing:    UE Dressing with Modified [place].  LE Dressing with Modified [place].  Grooming while standing with Modified [place].  Toileting from toilet with Modified [place] for hygiene and clothing management.   Toilet transfer to toilet with Modified [place].  Increased functional strength to WFL for self care.  Upper extremity exercise program x10 reps per handout, with independence.     Outcome: Unable to Meet   Pt found in SF w/ spouse in room & declined active OT tasks this date 2/2 having just returned BTB after toileting on toilet w/ SBA per pt/spouse. Pt stated that he is (I) w/ BADLs & no longer required acute OT svcs.  Discussed energy conservation techs & PLBing w/ rec for shower chair.  Provided red Tband w/ handout for UB TE/HEP.  Edu/tx re: general safety techs, endurance tx, PLBing & HEP. Pt/spouse verbalized understanding.    Per pt/spouse request, acute OT to d/c this date. Pt/spouse state that pt has returned to PLOF (I). DC OT.

## 2024-09-20 NOTE — PLAN OF CARE
Discharge orders noted. AVS prepared with medication details, clinical reference list and 24/7 [facility] Nurse On Call number attached. AVS review to follow.

## 2024-09-20 NOTE — PT/OT/SLP PROGRESS
Physical Therapy Treatment    Patient Name:  Yoan Coyne   MRN:  [id number]    Recommendations:     Discharge Recommendations:  (TBD pending progress with therapies)  Discharge Equipment Recommendations:  (TBD, possibly RW)  Barriers to discharge:  decreased functional mobility  from PLOF    Assessment:     Yoan Coyne is a 73 y.o. male admitted with a medical diagnosis of Hypoglycemia.  He presents with the following impairments/functional limitations: weakness, impaired endurance, impaired self care skills, impaired functional mobility, gait instability, decreased lower extremity function, impaired cardiopulmonary response to activity, decreased upper extremity function Pt would continue to benefit from P.T. To address impairments listed above.  .    Rehab Prognosis: Fair; patient would benefit from acute skilled PT services to address these deficits and reach maximum level of function.    Recent Surgery: * No surgery found *      Plan:     During this hospitalization, patient to be seen 4 x/week to address the identified rehab impairments via gait training, therapeutic activities, therapeutic exercises, neuromuscular re-education and progress toward the following goals:    Plan of Care Expires:  10/18/24    Subjective       Patient/Family Comments/goals: Pt agreed to tx.  Pain/Comfort:  Pain Rating 1: 0/10  Pain Rating Post-Intervention 1: 0/10      Objective:     Communicated with Rn prior to session.  Patient found HOB elevated with telemetry, peripheral IV upon PT entry to room.     General Precautions: Standard, fall  Orthopedic Precautions: N/A  Braces: N/A  Respiratory Status: Room air     Functional Mobility:  Bed Mobility:     Supine to Sit: stand by assistance  Sit to Supine: stand by assistance  Transfers:     Sit to Stand:  contact guard assistance with rolling walker and without A.D.  Gait: 30ft with Rw and CGA/SBA, then 60ft without A.D. with CGA fairly steady gait, but would benefit from  continued gait training without an A.D.for continued work on improving balance to assist with pt's return to PLOF..  Balance: sitting good, standing good, gait fair/fair+      AM-PAC 6 CLICK MOBILITY  Turning over in bed (including adjusting bedclothes, sheets and blankets)?: 3  Sitting down on and standing up from a chair with arms (e.g., wheelchair, bedside commode, etc.): 3  Moving from lying on back to sitting on the side of the bed?: 3  Moving to and from a bed to a chair (including a wheelchair)?: 3  Need to walk in hospital room?: 3  Climbing 3-5 steps with a railing?: 3  Basic Mobility Total Score: 18       Treatment & Education:  Pt education on the role of PT.  Seated BLE therex: APs, LAQs, hip flexion 12 x 2 reps with vc's for proper technique.  Gait training as above with pt benefiting from continued gait training without an A.D. to work on balance/stability and assist his return to PLOF    Patient left HOB elevated with all lines intact, call button in reach, bed alarm on, and RN notified..    GOALS:   Multidisciplinary Problems       Physical Therapy Goals          Problem: Physical Therapy    Goal Priority Disciplines Outcome Goal Variances Interventions   Physical Therapy Goal     PT, PT/OT Progressing     Description: Goals to be met by: 10/18/2024     Patient will increase functional independence with mobility by performin. Supine to sit with Modified [place]  2. Sit to supine with Modified [place]  3. Sit to stand transfer with Modified [place] with or without appropriate AD  4. Bed to chair transfer with Modified [place] with or without appropriate AD  5. Gait  x 200 feet with Modified [place] with or without appropriate AD  6. Lower extremity exercise program 2x10 reps with independence                         Time Tracking:     PT Received On: 24  PT Start Time: 1103     PT Stop Time: 1126  PT Total Time (min): 23 min     Billable Minutes: Gait Training 13  and Therapeutic Activity 10    Treatment Type: Treatment  PT/PTA: PTA     Number of PTA visits since last PT visit: 1 09/20/2024

## 2024-09-23 LAB
BACTERIA BLD CULT: NORMAL
BACTERIA BLD CULT: NORMAL
L PNEUMO AG UR QL IA: NEGATIVE

## 2024-09-23 NOTE — PROGRESS NOTES
Subjective:       Patient ID: Yoan Coyne is a 73 y.o. male.    Chief Complaint: VT     This is a 73 y.o.  male patient that is new to me.  The patient was admitted to the hospital on 9/17/24 for hypoglycemia. Patient was found to be in urinary retention with TALYA. Urine culture 9/18/24 negative for infection, CT scan from February of 2024 showing mild prostatomegaly with prostatic calcifications with normal-appearing bladder and kidneys. Dey catheter was placed, instructed to continue flomax outpatient.   Patient is here today for voiding trial.  Patient reports baseline LUTS consisted of weak stream, 1 episode of urge incontinence.Denies previous straining with urination, frequency, urgency, flank pain, suprapubic pain, fevers, chills. Denies ever taking medication for his prostate prior to hospitalization. Reports that he has been taking flomax daily.         Lab Results   Component Value Date    CREATININE 1.6 (H) 09/20/2024       ---  PMH/PSH/Medications/Allergies/Social history reviewed and as in chart.    Review of Systems   Constitutional:  Negative for activity change, chills and fever.   Respiratory:  Negative for shortness of breath.    Cardiovascular:  Negative for chest pain and palpitations.   Gastrointestinal:  Negative for abdominal pain and constipation.   Genitourinary:  Positive for difficulty urinating. Negative for dysuria, flank pain, frequency, hematuria and urgency.   Neurological:  Negative for dizziness and light-headedness.       Objective:      Physical Exam  HENT:      Head: Normocephalic.   Pulmonary:      Effort: Pulmonary effort is normal.   Abdominal:      General: Abdomen is flat.      Palpations: Abdomen is soft.   Genitourinary:     Penis: Normal and uncircumcised.       Testes: Normal.      Comments: Bladder filled with 150cc of sterile water. Balloon deflated and dey catheter removed without difficulty.  Musculoskeletal:         General: Normal range of motion.       Cervical back: Normal range of motion.   Skin:     General: Skin is warm and dry.   Neurological:      Mental Status: He is alert and oriented to person, place, and time.         Assessment:     Problem Noted   Acute Urinary Retention 9/18/2024 9/24/24-- 1st VT         Plan:     Voiding trial performed by Nurse Practitioner.  150ml of sterile water was instilled into bladder.  Dey catheter was removed. Patient urinated 125ml without difficulty.   Patient was instructed to drink plenty of fluids today.  Instructed patient to call at 1p.m. to give an update on urine output.  I instructed patient to return to clinic or emergency department (if after clinic hours) to have dey catheter put back in if unable to urinate within 5 hours of dey catheter removal or starts to experience bladder pressure/pain, decrease flow, straining/difficulty urinating, urinary frequency. Patient voiced understanding.   Continue taking flomax daily.  Avoid bladder irritants including but not limited to caffeine, alcohol, smoking, spicy foods, acidic foods, tomato-based products, citrus, artificial sweeteners, chocolate, coffee or tea.    Return to clinic in 4 weeks for pvr.     SHAQUILLE Valdes    I spent a total of 25 minutes on the day of the visit.This includes face to face time and non-face to face time preparing to see the patient (eg, review of tests), obtaining and/or reviewing separately obtained history, documenting clinical information in the electronic or other health record, independently interpreting results and communicating results to the patient/family/caregiver, or care coordinator.

## 2024-09-24 ENCOUNTER — OFFICE VISIT (OUTPATIENT)
Dept: UROLOGY | Facility: CLINIC | Age: 74
End: 2024-09-24
Payer: MEDICARE

## 2024-09-24 ENCOUNTER — TELEPHONE (OUTPATIENT)
Dept: UROLOGY | Facility: CLINIC | Age: 74
End: 2024-09-24

## 2024-09-24 VITALS
WEIGHT: 159.38 LBS | BODY MASS INDEX: 25.01 KG/M2 | HEIGHT: 67 IN | HEART RATE: 68 BPM | DIASTOLIC BLOOD PRESSURE: 68 MMHG | SYSTOLIC BLOOD PRESSURE: 105 MMHG

## 2024-09-24 DIAGNOSIS — R33.8 ACUTE URINARY RETENTION: Primary | ICD-10-CM

## 2024-09-24 PROCEDURE — 99213 OFFICE O/P EST LOW 20 MIN: CPT | Mod: PBBFAC,PO

## 2024-09-24 PROCEDURE — 99999 PR PBB SHADOW E&M-EST. PATIENT-LVL III: CPT | Mod: PBBFAC,,,

## 2024-09-24 PROCEDURE — 99212 OFFICE O/P EST SF 10 MIN: CPT | Mod: S$PBB,,,

## 2024-09-24 NOTE — TELEPHONE ENCOUNTER
I received a call from pt's daughter (did not give her name) who states the patient is voiding well since his catheter came out this morning. I notified Shala Wiggins NP. We also confirmed his f/u appt with Shala on 10/22.

## 2024-09-30 ENCOUNTER — OFFICE VISIT (OUTPATIENT)
Dept: PRIMARY CARE CLINIC | Facility: CLINIC | Age: 74
End: 2024-09-30
Payer: MEDICARE

## 2024-09-30 ENCOUNTER — LAB VISIT (OUTPATIENT)
Dept: LAB | Facility: HOSPITAL | Age: 74
End: 2024-09-30
Attending: INTERNAL MEDICINE
Payer: MEDICARE

## 2024-09-30 VITALS
DIASTOLIC BLOOD PRESSURE: 66 MMHG | BODY MASS INDEX: 24.6 KG/M2 | OXYGEN SATURATION: 98 % | HEIGHT: 67 IN | HEART RATE: 68 BPM | WEIGHT: 156.75 LBS | SYSTOLIC BLOOD PRESSURE: 102 MMHG

## 2024-09-30 DIAGNOSIS — R26.81 GAIT INSTABILITY: ICD-10-CM

## 2024-09-30 DIAGNOSIS — R26.89 SHUFFLING GAIT: ICD-10-CM

## 2024-09-30 DIAGNOSIS — E08.649 DIABETES MELLITUS DUE TO UNDERLYING CONDITION WITH HYPOGLYCEMIA WITHOUT COMA: ICD-10-CM

## 2024-09-30 DIAGNOSIS — N17.9 AKI (ACUTE KIDNEY INJURY): ICD-10-CM

## 2024-09-30 DIAGNOSIS — E16.2 HYPOGLYCEMIA: Primary | ICD-10-CM

## 2024-09-30 DIAGNOSIS — G93.40 ACUTE ENCEPHALOPATHY: ICD-10-CM

## 2024-09-30 DIAGNOSIS — R79.89 LOW TSH LEVEL: ICD-10-CM

## 2024-09-30 DIAGNOSIS — R41.89 COGNITIVE IMPAIRMENT: ICD-10-CM

## 2024-09-30 DIAGNOSIS — N40.0 ENLARGED PROSTATE: ICD-10-CM

## 2024-09-30 DIAGNOSIS — Z91.81 AT HIGH RISK FOR FALLS: ICD-10-CM

## 2024-09-30 DIAGNOSIS — R33.8 ACUTE URINARY RETENTION: ICD-10-CM

## 2024-09-30 PROBLEM — K80.00 ACUTE CHOLECYSTITIS DUE TO BILIARY CALCULUS: Status: RESOLVED | Noted: 2024-02-29 | Resolved: 2024-09-30

## 2024-09-30 PROBLEM — E11.9 TYPE 2 DIABETES MELLITUS WITHOUT COMPLICATION, WITHOUT LONG-TERM CURRENT USE OF INSULIN: Status: RESOLVED | Noted: 2024-02-29 | Resolved: 2024-09-30

## 2024-09-30 PROBLEM — E87.1 HYPONATREMIA: Status: RESOLVED | Noted: 2024-09-18 | Resolved: 2024-09-30

## 2024-09-30 PROBLEM — K81.1 CHRONIC CHOLECYSTITIS: Status: RESOLVED | Noted: 2024-02-29 | Resolved: 2024-09-30

## 2024-09-30 LAB
ANION GAP SERPL CALC-SCNC: 10 MMOL/L (ref 8–16)
BUN SERPL-MCNC: 21 MG/DL (ref 8–23)
CALCIUM SERPL-MCNC: 10.3 MG/DL (ref 8.7–10.5)
CHLORIDE SERPL-SCNC: 109 MMOL/L (ref 95–110)
CO2 SERPL-SCNC: 19 MMOL/L (ref 23–29)
CREAT SERPL-MCNC: 1.3 MG/DL (ref 0.5–1.4)
EST. GFR  (NO RACE VARIABLE): 59 ML/MIN/1.73 M^2
GLUCOSE SERPL-MCNC: 161 MG/DL (ref 70–110)
POTASSIUM SERPL-SCNC: 4.7 MMOL/L (ref 3.5–5.1)
SODIUM SERPL-SCNC: 138 MMOL/L (ref 136–145)
TSH SERPL DL<=0.005 MIU/L-ACNC: 2.21 UIU/ML (ref 0.4–4)

## 2024-09-30 PROCEDURE — 99999 PR PBB SHADOW E&M-EST. PATIENT-LVL IV: CPT | Mod: PBBFAC,,, | Performed by: INTERNAL MEDICINE

## 2024-09-30 PROCEDURE — 99214 OFFICE O/P EST MOD 30 MIN: CPT | Mod: PBBFAC,PO | Performed by: INTERNAL MEDICINE

## 2024-09-30 PROCEDURE — 80048 BASIC METABOLIC PNL TOTAL CA: CPT | Performed by: INTERNAL MEDICINE

## 2024-09-30 PROCEDURE — 84443 ASSAY THYROID STIM HORMONE: CPT | Performed by: INTERNAL MEDICINE

## 2024-09-30 PROCEDURE — 36415 COLL VENOUS BLD VENIPUNCTURE: CPT | Performed by: INTERNAL MEDICINE
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
## 2024-10-21 NOTE — PROGRESS NOTES
Subjective:       Patient ID: Yoan Coyne is a 73 y.o. male.    Chief Complaint: VT     This is a 73 y.o.  male patient that is an established patient of mine. The patient was admitted to the hospital on 9/17/24 for hypoglycemia. Patient was found to be in urinary retention with TALYA. Urine culture 9/18/24 negative for infection, CT scan from February of 2024 showing mild prostatomegaly with prostatic calcifications with normal-appearing bladder and kidneys. Henry catheter was placed, instructed to continue flomax outpatient.   9/24/24-- VT passed, taking flomax daily. Denies previous straining with urination, frequency, urgency, flank pain, suprapubic pain, fevers, chills. Denies ever taking medication for his prostate prior to hospitalization.   10/22/24-- Here today for f/u with PVR.  PVR 62ml, last void was 2 hours ago.  Reports that he is taking flomax daily. Denies LUTS. Drinks 1 cup of coffee in the morning and 2 sodas per day.        Lab Results   Component Value Date    CREATININE 1.3 09/30/2024       ---  PMH/PSH/Medications/Allergies/Social history reviewed and as in chart.    Review of Systems   Constitutional:  Negative for activity change, chills and fever.   Respiratory:  Negative for shortness of breath.    Cardiovascular:  Negative for chest pain and palpitations.   Gastrointestinal:  Negative for abdominal pain and constipation.   Genitourinary:  Negative for difficulty urinating, dysuria, flank pain, frequency, hematuria and urgency.   Neurological:  Negative for dizziness and light-headedness.       Objective:      Physical Exam  HENT:      Head: Normocephalic.   Pulmonary:      Effort: Pulmonary effort is normal.   Abdominal:      General: Abdomen is flat.      Palpations: Abdomen is soft.   Musculoskeletal:         General: Normal range of motion.      Cervical back: Normal range of motion.   Skin:     General: Skin is warm and dry.   Neurological:      Mental Status: He is alert and oriented  to person, place, and time.       Assessment:     Problem Noted   Benign Prostatic Hyperplasia With Urinary Obstruction 9/18/2024 9/24/24-- 1st VT         Plan:     Continue taking flomax qhs.  Avoid bladder irritants including but not limited to caffeine, alcohol, smoking, spicy foods, acidic foods, tomato-based products, citrus, artificial sweeteners, chocolate, coffee or tea. Drink 2L of water per day.  Follow-up PRN for problems and concerns.    SHAQUILLE Valdes    I spent a total of 25 minutes on the day of the visit.This includes face to face time and non-face to face time preparing to see the patient (eg, review of tests), obtaining and/or reviewing separately obtained history, documenting clinical information in the electronic or other health record, independently interpreting results and communicating results to the patient/family/caregiver, or care coordinator.

## 2024-10-22 ENCOUNTER — OFFICE VISIT (OUTPATIENT)
Dept: UROLOGY | Facility: CLINIC | Age: 74
End: 2024-10-22
Payer: MEDICARE

## 2024-10-22 VITALS
SYSTOLIC BLOOD PRESSURE: 112 MMHG | BODY MASS INDEX: 24.74 KG/M2 | HEART RATE: 62 BPM | WEIGHT: 157.63 LBS | DIASTOLIC BLOOD PRESSURE: 65 MMHG | HEIGHT: 67 IN

## 2024-10-22 DIAGNOSIS — N40.1 BENIGN PROSTATIC HYPERPLASIA WITH URINARY OBSTRUCTION: ICD-10-CM

## 2024-10-22 DIAGNOSIS — N13.8 BENIGN PROSTATIC HYPERPLASIA WITH URINARY OBSTRUCTION: ICD-10-CM

## 2024-10-22 DIAGNOSIS — R33.8 ACUTE URINARY RETENTION: Primary | ICD-10-CM

## 2024-10-22 LAB — POC RESIDUAL URINE VOLUME: 67 ML (ref 0–100)

## 2024-10-22 PROCEDURE — 99999 PR PBB SHADOW E&M-EST. PATIENT-LVL III: CPT | Mod: PBBFAC,,,

## 2024-10-22 PROCEDURE — 99212 OFFICE O/P EST SF 10 MIN: CPT | Mod: S$PBB,,,

## 2024-10-22 PROCEDURE — 99999PBSHW POCT BLADDER SCAN: Mod: PBBFAC,,,

## 2024-10-22 PROCEDURE — 51798 US URINE CAPACITY MEASURE: CPT | Mod: PBBFAC,PO

## 2024-10-22 PROCEDURE — 99213 OFFICE O/P EST LOW 20 MIN: CPT | Mod: PBBFAC,PO

## 2024-11-04 ENCOUNTER — LAB VISIT (OUTPATIENT)
Dept: LAB | Facility: HOSPITAL | Age: 74
End: 2024-11-04
Attending: STUDENT IN AN ORGANIZED HEALTH CARE EDUCATION/TRAINING PROGRAM
Payer: MEDICARE

## 2024-11-04 ENCOUNTER — OFFICE VISIT (OUTPATIENT)
Dept: NEUROLOGY | Facility: CLINIC | Age: 74
End: 2024-11-04
Payer: MEDICARE

## 2024-11-04 VITALS
WEIGHT: 158 LBS | HEART RATE: 87 BPM | SYSTOLIC BLOOD PRESSURE: 106 MMHG | HEIGHT: 67 IN | BODY MASS INDEX: 24.8 KG/M2 | DIASTOLIC BLOOD PRESSURE: 65 MMHG

## 2024-11-04 DIAGNOSIS — R41.89 COGNITIVE DECLINE: ICD-10-CM

## 2024-11-04 DIAGNOSIS — G31.84 MILD COGNITIVE IMPAIRMENT: Primary | ICD-10-CM

## 2024-11-04 DIAGNOSIS — R41.3 OTHER AMNESIA: ICD-10-CM

## 2024-11-04 LAB
FOLATE SERPL-MCNC: 4.6 NG/ML (ref 4–24)
TREPONEMA PALLIDUM IGG+IGM AB [PRESENCE] IN SERUM OR PLASMA BY IMMUNOASSAY: NONREACTIVE
VIT B12 SERPL-MCNC: 161 PG/ML (ref 210–950)

## 2024-11-04 PROCEDURE — 86593 SYPHILIS TEST NON-TREP QUANT: CPT | Performed by: STUDENT IN AN ORGANIZED HEALTH CARE EDUCATION/TRAINING PROGRAM

## 2024-11-04 PROCEDURE — 82746 ASSAY OF FOLIC ACID SERUM: CPT | Performed by: STUDENT IN AN ORGANIZED HEALTH CARE EDUCATION/TRAINING PROGRAM

## 2024-11-04 PROCEDURE — 82607 VITAMIN B-12: CPT | Performed by: STUDENT IN AN ORGANIZED HEALTH CARE EDUCATION/TRAINING PROGRAM

## 2024-11-04 PROCEDURE — 99999 PR PBB SHADOW E&M-EST. PATIENT-LVL IV: CPT | Mod: PBBFAC,,, | Performed by: STUDENT IN AN ORGANIZED HEALTH CARE EDUCATION/TRAINING PROGRAM

## 2024-11-04 PROCEDURE — 99214 OFFICE O/P EST MOD 30 MIN: CPT | Mod: PBBFAC,PN | Performed by: STUDENT IN AN ORGANIZED HEALTH CARE EDUCATION/TRAINING PROGRAM

## 2024-11-04 PROCEDURE — 84425 ASSAY OF VITAMIN B-1: CPT | Performed by: STUDENT IN AN ORGANIZED HEALTH CARE EDUCATION/TRAINING PROGRAM

## 2024-11-04 NOTE — PROGRESS NOTES
GENERAL NEUROLOGY VISIT   Date: 11/4/24  Patient Name: Yoan Coyne   MRN: [id number]   PCP: Merline Puri  Referring Provider: Self, Aaareferral    History:    Patient is a 73 y.o.  male who self-referral for memory concern.     Yoan Coyne is a 73 y.o. male with past medical history of non-insulin dependent T2DM, coronary artery disease with previous MI in 2011 s/p CABG & stent, hypertension, hyperlipidemia, asbestos exposure who presents to ED with complaint of generalized fatigue, confusion and urinary urgency x3 days. Patient admitted to [facility] for acute encephalopathy, persistent hypoglycemia requiring D10 gtt. Hypoglycemia & encephalopathy 2/2 no po intake while taking glyburide, now off D10 gtt, maintaining glucoses. Now with urinary retention requiring I/O likely 2/2 BPH, s/p dey placement 9/19. Symptoms resolved during admission, discontinue glyburide & follow with urology outpatient for voiding trial.     Patient is very hard hearing.   Admitted to the hospital due to hypoglycemia, per wife he was feverish, hallucinating, confused and couldn't walk. Reported leukocytosis w possible prostatitis treated with Abx.     Wife reported short term memory concern, reported he would forget what she asked him to do and told her forget, not sure if can not hear or forget, patient reported he asks a lot of questions and his wife thinks due to forgetful, he does not think he has a memory problems, still driving, cut grass, reported he did not do well on MoCA because she did not have much education.     Patient is very hard hearing and never had hearing aids.     Sleep: go to be varies 9pm-1am, he use tablet and keep him awake, he sleeps a lot during the day, reported frequent waking up frequently for bathroom. No REM sleep behavior reported.    No issues with navigation while driving, no issue with handling finance.        MoCA 20/30    Past Medical History:   Diagnosis Date    Coronary artery disease      Diabetes mellitus     Hypertension     Urinary tract infection        Past Surgical History:   Procedure Laterality Date    REMOVAL-TUBE  4/12/2024    Procedure: REMOVAL-TUBE (INTRA-ABDOMINAL TUBE);  Surgeon: Wily Ugarte MD;  Location: [facility] OR;  Service: General;;    ROBOT-ASSISTED CHOLECYSTECTOMY N/A 4/12/2024    Procedure: ROBOTIC CHOLECYSTECTOMY;  Surgeon: Wily Ugarte MD;  Location: [facility] OR;  Service: General;  Laterality: N/A;       Social History     Socioeconomic History    Marital status:    Tobacco Use    Smoking status: Never     Passive exposure: Never    Smokeless tobacco: Never     Social Drivers of Health     Financial Resource Strain: Low Risk  (9/19/2024)    Overall Financial Resource Strain (CARDIA)     Difficulty of Paying Living Expenses: Not hard at all   Food Insecurity: No Food Insecurity (9/19/2024)    Hunger Vital Sign     Worried About Running Out of Food in the Last Year: Never true     Ran Out of Food in the Last Year: Never true   Transportation Needs: No Transportation Needs (9/19/2024)    TRANSPORTATION NEEDS     Transportation : No   Physical Activity: Inactive (9/19/2024)    Exercise Vital Sign     Days of Exercise per Week: 0 days     Minutes of Exercise per Session: 0 min   Stress: No Stress Concern Present (9/19/2024)    [employer] [place] [employer] - Occupational Stress Questionnaire     Feeling of Stress : Not at all   Housing Stability: Low Risk  (9/19/2024)    Housing Stability Vital Sign     Unable to Pay for Housing in the Last Year: No     Homeless in the Last Year: No       Review of patient's allergies indicates:  No Known Allergies    Current Outpatient Medications on File Prior to Visit   Medication Sig Dispense Refill    aspirin (ECOTRIN) 81 MG EC tablet Take 81 mg by mouth once daily.      atorvastatin (LIPITOR) 40 MG tablet Take 40 mg by mouth once daily.      clopidogreL (PLAVIX) 75 mg tablet Take 75 mg by mouth once daily.       "metFORMIN (GLUCOPHAGE) 1000 MG tablet Take 1,000 mg by mouth every morning. Take with 1/2 tablet of 500 mg(1250 mg)      metFORMIN (GLUCOPHAGE) 500 MG tablet Take 250 mg by mouth 2 (two) times a day.      metoprolol tartrate (LOPRESSOR) 100 MG tablet Take 50 mg by mouth 2 (two) times daily.      ramipriL (ALTACE) 2.5 MG capsule Take 2.5 mg by mouth every evening.      tamsulosin (FLOMAX) 0.4 mg Cap Take 1 capsule (0.4 mg total) by mouth once daily. 30 capsule 11     No current facility-administered medications on file prior to visit.        Family history:  No family history on file.    Review Of Systems     Constitutional Negative for fevers, chills, weigh loss   HEENT Negative for hearing loss, dysphagia, sore throat, diplopia   Respiratory Negative for shortness of breath, cough    Cardiovascular Negative for chest pain, palpitations    Gastrointestinal Negative for constipation, diarrhea, early satiety    Skin Negative for rashes    Musculoskeletal Negative for joint pains, myalgias.   Neurological See Above    Psychological Negative for sleep disturbances.    Heme/Lymph Negative for easy bruising, easy bleeding    Endocrine Negative for polyuria, polydypsia     Physical Exam:     Physical Examination    Vitals: /65 (BP Location: Left arm, Patient Position: Sitting)   Pulse 87   Ht 5' 7" (1.702 m)   Wt 71.7 kg (158 lb)   BMI 24.75 kg/m²       NEURO    Neurological Exam  Very hard hearing   Mental Status:  Alert and oriented to person, place and time, recent and remote memory intact, normal attention span and fund of knowledge; Speech is slow bu spontaneous and fluent     Cranial Nerve exam:  II: Visual fields full to confrontation; Pupils equal round and reactive about 3mm  III, IV, VI: Extraoccular movements intact with no nystagmus  V: Sensation in V1, V2, V3 intact to light-touch bilaterally,  VII:  No facial weakness,  VIII: Hearing grossly intact  IX,X: palate elevation symmetric   XI: SCM & " Trapezius normal,  XII: tongue midline, normal morphology, tongue movement normal     Motor Exam: No involuntary movement. Normal tone and bulk in all 4 extremities  Strength: 5/5 throughout   Reflexes: 1+ throughout    Sensory Exam: Intact touch, pain and vibration in all 4 limbs    Cerebellar Sign: Normal Finger-to-nose, bilaterally.  Gait:  Normal steady physiologic gait with normal arm swinging on both side     Interval/Previous Work-up:   Reviewed      Assessment and Plan:   Yoan Coyne is a 73 y.o. male presenting after hospital admission for hypoglycemia and leukocytosis with acute encephalopathy path the which resolved before discharge, here for memory evaluation, patient is very hard hearing, does not have hearing aids, accompany with his wife who gives some of the history.  Patient is not concern about his memory however the wife notice in the past year profile with short-term memory, described as forget what he she asked him to do and he would say I forget, patient reported that he remember usually he would take his time to do the task also he ask a lot of questions because he likes to talk, no issues with ADLs , driving, shopping, no sleep issues or REM sleep behavior, scored 20/30 on MoCA.     Mild cognitive impairment superimposed by hearing impairment  Problem List Items Addressed This Visit    None  Visit Diagnoses       Mild cognitive impairment    -  Primary    Cognitive decline        Other amnesia        Relevant Orders    VITAMIN B12    VITAMIN B1    FOLATE    Treponema Pallidium Antibodies IgG, IgM    [id number], Plasma    MRI Brain Without Contrast          -Patient with benefit from hearing test and hearing aid, patient reported due to insurance restrictions he was not able to has a hearing test.    -patient reported that currently he is trying to get a glucometer to check his glucose in home for better control, reported he has been struggling with the insurance to get access to medication  and equipment and he is overwhelmed with that.  -we also discussed memory medication if needed, but he does not Want to add another medication to his list for now.  -neuropsychology evaluation offered and patient refused as well    RTC as needed    Time spent on this encounter:  60 minutes. This includes face to face time and non-face to face time preparing to see the patient (eg, review of tests), obtaining and/or reviewing separately obtained history, documenting clinical information in the electronic or other health record, independently interpreting results and communicating results to the patient/family/caregiver, or care coordinator.       A dictation device was used to produce this document. Use of such devices sometimes results in grammatical errors or replacement of words that sound similarly.     Jona Boateng MD, M.B.Ch.B  Neurology, Vascular neurology  [facility]

## 2024-11-06 ENCOUNTER — PATIENT MESSAGE (OUTPATIENT)
Dept: NEUROLOGY | Facility: CLINIC | Age: 74
End: 2024-11-06
Payer: MEDICARE

## 2024-11-06 DIAGNOSIS — R41.89 COGNITIVE DECLINE: Primary | ICD-10-CM

## 2024-11-06 RX ORDER — CYANOCOBALAMIN 1000 UG/ML
1000 INJECTION, SOLUTION INTRAMUSCULAR; SUBCUTANEOUS
Status: SHIPPED | OUTPATIENT
Start: 2024-11-07

## 2024-11-06 NOTE — TELEPHONE ENCOUNTER
Serum B12 is 161     -B12 injection 1000 mcg 1 time weekly for 2 weeks  -repeat B12 level after complete the injections  -follow up with PCP for continued care

## 2024-11-08 ENCOUNTER — HOSPITAL ENCOUNTER (OUTPATIENT)
Dept: RADIOLOGY | Facility: HOSPITAL | Age: 74
Discharge: HOME | End: 2024-11-08
Attending: STUDENT IN AN ORGANIZED HEALTH CARE EDUCATION/TRAINING PROGRAM
Payer: MEDICARE

## 2024-11-08 DIAGNOSIS — R41.3 OTHER AMNESIA: ICD-10-CM

## 2024-11-08 LAB — VIT B1 BLD-MCNC: 54 UG/L (ref 38–122)

## 2024-11-08 PROCEDURE — 70551 MRI BRAIN STEM W/O DYE: CPT | Mod: TC

## 2024-11-08 PROCEDURE — 70551 MRI BRAIN STEM W/O DYE: CPT | Mod: 26,,, | Performed by: RADIOLOGY

## 2024-11-11 ENCOUNTER — PATIENT MESSAGE (OUTPATIENT)
Dept: NEUROLOGY | Facility: CLINIC | Age: 74
End: 2024-11-11
Payer: MEDICARE

## 2024-11-11 DIAGNOSIS — G31.84 MILD COGNITIVE IMPAIRMENT: Primary | ICD-10-CM

## 2024-11-14 ENCOUNTER — CLINICAL SUPPORT (OUTPATIENT)
Dept: NEUROLOGY | Facility: CLINIC | Age: 74
End: 2024-11-14
Payer: MEDICARE

## 2024-11-14 DIAGNOSIS — E53.8 B12 DEFICIENCY: Primary | ICD-10-CM

## 2024-11-14 PROCEDURE — 96372 THER/PROPH/DIAG INJ SC/IM: CPT | Mod: PBBFAC,PN

## 2024-11-14 PROCEDURE — 99999PBSHW PR PBB SHADOW TECHNICAL ONLY FILED TO HB: Mod: PBBFAC,,,

## 2024-11-14 PROCEDURE — 99999 PR PBB SHADOW E&M-EST. PATIENT-LVL II: CPT | Mod: PBBFAC,,,

## 2024-11-14 RX ADMIN — CYANOCOBALAMIN 1000 MCG: 1000 INJECTION, SOLUTION INTRAMUSCULAR at 08:11

## 2025-02-08 ENCOUNTER — HOSPITAL ENCOUNTER (EMERGENCY)
Facility: HOSPITAL | Age: 75
Discharge: HOME | End: 2025-02-08
Attending: STUDENT IN AN ORGANIZED HEALTH CARE EDUCATION/TRAINING PROGRAM
Payer: MEDICARE

## 2025-02-08 VITALS
SYSTOLIC BLOOD PRESSURE: 149 MMHG | DIASTOLIC BLOOD PRESSURE: 70 MMHG | WEIGHT: 165 LBS | OXYGEN SATURATION: 98 % | HEART RATE: 57 BPM | TEMPERATURE: 97 F | RESPIRATION RATE: 16 BRPM | BODY MASS INDEX: 25.9 KG/M2 | HEIGHT: 67 IN
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
## 2025-02-08 PROCEDURE — 99284 EMERGENCY DEPT VISIT MOD MDM: CPT | Mod: 25

## 2025-02-08 PROCEDURE — 25000003 PHARM REV CODE 250: Performed by: STUDENT IN AN ORGANIZED HEALTH CARE EDUCATION/TRAINING PROGRAM

## 2025-02-08 PROCEDURE — 90715 TDAP VACCINE 7 YRS/> IM: CPT | Performed by: STUDENT IN AN ORGANIZED HEALTH CARE EDUCATION/TRAINING PROGRAM

## 2025-02-08 PROCEDURE — 63600175 PHARM REV CODE 636 W HCPCS: Performed by: STUDENT IN AN ORGANIZED HEALTH CARE EDUCATION/TRAINING PROGRAM

## 2025-02-08 PROCEDURE — 12051 INTMD RPR FACE/MM 2.5 CM/<: CPT

## 2025-02-08 PROCEDURE — 12032 INTMD RPR S/A/T/EXT 2.6-7.5: CPT

## 2025-02-08 PROCEDURE — 90471 IMMUNIZATION ADMIN: CPT | Performed by: STUDENT IN AN ORGANIZED HEALTH CARE EDUCATION/TRAINING PROGRAM

## 2025-02-08 RX ORDER — OXYCODONE AND ACETAMINOPHEN 5; 325 MG/1; MG/1
1 TABLET ORAL
Status: COMPLETED | OUTPATIENT
Start: 2025-02-08 | End: 2025-02-08

## 2025-02-08 RX ORDER — AMOXICILLIN AND CLAVULANATE POTASSIUM 875; 125 MG/1; MG/1
1 TABLET, FILM COATED ORAL 2 TIMES DAILY
Qty: 14 TABLET | Refills: 0 | Status: SHIPPED | OUTPATIENT
Start: 2025-02-08

## 2025-02-08 RX ORDER — LIDOCAINE HYDROCHLORIDE AND EPINEPHRINE 10; 10 UG/ML; MG/ML
5 INJECTION, SOLUTION INFILTRATION; PERINEURAL
Status: COMPLETED | OUTPATIENT
Start: 2025-02-08 | End: 2025-02-08

## 2025-02-08 RX ORDER — AMOXICILLIN AND CLAVULANATE POTASSIUM 875; 125 MG/1; MG/1
1 TABLET, FILM COATED ORAL
Status: COMPLETED | OUTPATIENT
Start: 2025-02-08 | End: 2025-02-08

## 2025-02-08 RX ADMIN — CLOSTRIDIUM TETANI TOXOID ANTIGEN (FORMALDEHYDE INACTIVATED), CORYNEBACTERIUM DIPHTHERIAE TOXOID ANTIGEN (FORMALDEHYDE INACTIVATED), BORDETELLA PERTUSSIS TOXOID ANTIGEN (GLUTARALDEHYDE INACTIVATED), BORDETELLA PERTUSSIS FILAMENTOUS HEMAGGLUTININ ANTIGEN (FORMALDEHYDE INACTIVATED), BORDETELLA PERTUSSIS PERTACTIN ANTIGEN, AND BORDETELLA PERTUSSIS FIMBRIAE 2/3 ANTIGEN 0.5 ML: 5; 2; 2.5; 5; 3; 5 INJECTION, SUSPENSION INTRAMUSCULAR at 03:02

## 2025-02-08 RX ADMIN — AMOXICILLIN AND CLAVULANATE POTASSIUM 1 TABLET: 875; 125 TABLET, FILM COATED ORAL at 03:02

## 2025-02-08 RX ADMIN — LIDOCAINE HYDROCHLORIDE AND EPINEPHRINE 5 ML: 10; 10 INJECTION, SOLUTION INFILTRATION; PERINEURAL at 03:02

## 2025-02-08 RX ADMIN — OXYCODONE HYDROCHLORIDE AND ACETAMINOPHEN 1 TABLET: 5; 325 TABLET ORAL at 03:02

## 2025-02-08 NOTE — DISCHARGE INSTRUCTIONS

## 2025-02-08 NOTE — ED TRIAGE NOTES
C/o dog attack while at home. Reports dog is fully vaccinated and lives at home with family. Multiple abrasions/lacerations noted to bilateral arms, left cheek and behind left ear. Constant oozing noted from wounds, pt takes plavix daily. Unknown last tetanus. Aaox3. Resp even unlabored. Skin warm and dry.

## 2025-02-08 NOTE — ED PROVIDER NOTES
ED Provider Note - 2/8/2025    History     Chief Complaint   Patient presents with    Animal Bite     Pt was attacked by large breed dog, ~100lbs. Pt has several bite wounds to L lower arm, R lower arm, L cheek and L head. +blood thinners.       HPI     Yoan Coyne is a 74 y.o. year old male with past medical and surgical history as seen below, presenting with chief complaint of multiple scratches and bites from a dog.  Dog known to patient.  [facility].  100 lb.  Up-to-date on vaccines.  Wounds to above the left ear, left cheek, with multiple more superficial skin injuries to the bilateral forearms.  Multiple these areas have small hematomas associated with the.  Patient is on Plavix.      Past Medical History:   Diagnosis Date    Coronary artery disease     Diabetes mellitus     Hypertension     Urinary tract infection      Past Surgical History:   Procedure Laterality Date    REMOVAL-TUBE  4/12/2024    Procedure: REMOVAL-TUBE (INTRA-ABDOMINAL TUBE);  Surgeon: Wily Ugarte MD;  Location: [facility] OR;  Service: General;;    ROBOT-ASSISTED CHOLECYSTECTOMY N/A 4/12/2024    Procedure: ROBOTIC CHOLECYSTECTOMY;  Surgeon: Wily Ugarte MD;  Location: [facility] OR;  Service: General;  Laterality: N/A;         No family history on file.  Social History     Tobacco Use    Smoking status: Never     Passive exposure: Never    Smokeless tobacco: Never     Social Drivers of Health with Concerns     Physical Activity: Inactive (9/19/2024)    Exercise Vital Sign     Days of Exercise per Week: 0 days     Minutes of Exercise per Session: 0 min      Review of patient's allergies indicates:  No Known Allergies    Review of Systems     A full Review of Systems (ROS) was performed and was negative unless otherwise stated in the HPI.      Physical Exam     Vitals:    02/08/25 1422 02/08/25 1530   BP: 131/70    Pulse: 77    Resp: 20 18   Temp: 97.4 °F (36.3 °C)    TempSrc: Oral    SpO2: 99%    Weight: 74.8 kg (165 lb)    Height:  "5' 7" (1.702 m)         Physical Exam    Nursing note and vitals reviewed.  Constitutional: He appears well-developed and well-nourished. No distress.   HENT:   Head: Normocephalic and atraumatic.   Right Ear: External ear normal.   Left Ear: External ear normal.   Nose: Nose normal.   Eyes: Conjunctivae and EOM are normal. Pupils are equal, round, and reactive to light.   Neck: Neck supple.   Normal range of motion.  Cardiovascular:  Normal rate and regular rhythm.           Pulmonary/Chest: Breath sounds normal. No respiratory distress.   Musculoskeletal:         General: No edema. Normal range of motion.      Cervical back: Normal range of motion and neck supple.     Neurological: He is alert and oriented to person, place, and time. He has normal strength. No cranial nerve deficit or sensory deficit.   Skin: Skin is warm and dry. No rash noted.   Numerous skin tears to bilateral forearms.  1 cm laceration with associated hematoma to left cheek.  Laceration above left ear without ear involvement as well.   Psychiatric: He has a normal mood and affect. Thought content normal.         Lab Results- Independently reviewed by myself      Labs Reviewed - No data to display        Imaging     Imaging Results    None                    ED Course         Lac Repair    Date/Time: 2/8/2025 4:25 PM    Performed by: Ramírez Corbett PA-C  Authorized by: Ramírez Santoyo MD    Consent:     Consent obtained:  Verbal    Consent given by:  Patient    Risks discussed:  Infection and poor cosmetic result  Universal protocol:     Patient identity confirmed:  Arm band  Anesthesia:     Anesthesia method:  Local infiltration    Local anesthetic:  Lidocaine 1% WITH epi  Laceration details:     Location:  Scalp    Scalp location:  L temporal    Length (cm):  3  Exploration:     Hemostasis achieved with:  Direct pressure  Treatment:     Area cleansed with:  Saline and chlorhexidine    Amount of cleaning:  Extensive    Irrigation solution:  " Sterile saline    Irrigation method:  Syringe    Debridement:  None    Undermining:  None  Skin repair:     Repair method:  Staples    Number of staples:  5  Approximation:     Approximation:  Loose  Repair type:     Repair type:  Simple  Post-procedure details:     Dressing:  Sterile dressing    Procedure completion:  Tolerated well, no immediate complications  Lac Repair    Date/Time: 2/8/2025 4:26 PM    Performed by: Ramírez Corbett PA-C  Authorized by: Ramírez Santoyo MD    Consent:     Consent obtained:  Verbal    Consent given by:  Patient    Risks discussed:  Infection and poor cosmetic result  Universal protocol:     Patient identity confirmed:  Arm band  Anesthesia:     Anesthesia method:  Local infiltration    Local anesthetic:  Lidocaine 1% WITH epi  Laceration details:     Location:  Face    Face location:  L cheek    Length (cm):  1.5  Exploration:     Hemostasis achieved with:  Direct pressure  Treatment:     Area cleansed with:  Saline and chlorhexidine    Amount of cleaning:  Standard    Irrigation method:  Syringe    Debridement:  None  Skin repair:     Repair method:  Sutures    Suture size:  5-0    Suture material:  Prolene    Suture technique:  Simple interrupted    Number of sutures:  1  Approximation:     Approximation:  Loose  Repair type:     Repair type:  Simple  Post-procedure details:     Dressing:  Sterile dressing    Procedure completion:  Tolerated well, no immediate complications           Orders Placed This Encounter    LACERATION REPAIR    LACERATION REPAIR    LIDOcaine-EPINEPHrine 1%-1:100,000 injection 5 mL    Tdap vaccine injection 0.5 mL    amoxicillin-clavulanate 875-125mg per tablet 1 tablet    oxyCODONE-acetaminophen 5-325 mg per tablet 1 tablet    amoxicillin-clavulanate 875-125mg (AUGMENTIN) 875-125 mg per tablet          ED Course as of 02/08/25 1628   Sat Feb 08, 2025   1623 Bedside wounds were thoroughly irrigated.  Large laceration behind left ear and laceration to left  cheek were primary closed.  Patient tolerated well.  Given Augmentin in ED along with written prescription.  Tetanus updated.  Encouraged to continue monitoring wound healing closely with PCP/ED follow-up for wound check and suture removal.  ED return precautions were discussed at length with both patient and family.  They state their understanding and agree with plan [KS]      ED Course User Index  [KS] Ramírez Corbett PA-C              Medical Decision Making       The patient's list of active medical problems, social history, medications, and allergies as documented per RN staff has been reviewed.           Medical Decision Making  Risk  Prescription drug management.                    ED Prescriptions       Medication Sig Dispense Start Date End Date Auth. Provider    amoxicillin-clavulanate 875-125mg (AUGMENTIN) 875-125 mg per tablet Take 1 tablet by mouth 2 (two) times daily. 14 tablet 2/8/2025 -- Ramírez Corbett PA-C              Clinical Impression       Follow-up Information       Follow up With Specialties Details Why Contact Nola Anderson Mai, MD Family Medicine In 1 week For wound re-check, For suture removal [address]  [place] [facility] [address] [phone]              Referrals:  No orders of the defined types were placed in this encounter.      Disposition   ED Disposition Condition    Discharge Stable              Final diagnoses:  [W54.0XXA] Dog bite, initial encounter (Primary)        Ramírez Santoyo MD        02/08/2025          DISCLAIMER: This note was prepared with StrongView voice recognition transcription software. Garbled syntax, mangled pronouns, and other bizarre constructions may be attributed to that software system.       Ramírez Corbett PA-C  02/08/25 [id number]

## (undated) DEVICE — BAG TISSUE RETRIEVAL 225ML

## (undated) DEVICE — IRRIGATOR ENDOWRIST XI SUCTION

## (undated) DEVICE — TROCAR ENDOPATH XCEL 11MM 10CM

## (undated) DEVICE — Device

## (undated) DEVICE — COVER TIP CURVED SCISSORS XI

## (undated) DEVICE — TUBING INSUFFLATION W/LUER LOK

## (undated) DEVICE — KIT AIRSEAL BIFURCT FLTR TB

## (undated) DEVICE — NDL HYPO REG 25G X 1 1/2

## (undated) DEVICE — SEAL UNIVERSAL 5MM-8MM XI

## (undated) DEVICE — SUT MCRYL PLUS 4-0 PS2 27IN

## (undated) DEVICE — GLOVE SURGICAL LATEX SZ 8

## (undated) DEVICE — BAG TISSUE RETRIEVAL 5MM

## (undated) DEVICE — SUT 0 VICRYL / UR6 (J603)

## (undated) DEVICE — DRAPE ARM DAVINCI XI

## (undated) DEVICE — PAD PINK TRENDELENBURG POS XL

## (undated) DEVICE — OBTURATOR BLADELESS 8MM XI CLR

## (undated) DEVICE — SYS SEE SHARP SCOPE ANTIFOG

## (undated) DEVICE — COVER MAYO STND XL 30X57IN

## (undated) DEVICE — ELECTRODE REM PLYHSV RETURN 9

## (undated) DEVICE — CLIP HEMO-LOK ML